# Patient Record
Sex: FEMALE | Race: WHITE | Employment: FULL TIME | URBAN - METROPOLITAN AREA
[De-identification: names, ages, dates, MRNs, and addresses within clinical notes are randomized per-mention and may not be internally consistent; named-entity substitution may affect disease eponyms.]

---

## 2017-04-06 ENCOUNTER — TRANSFERRED RECORDS (OUTPATIENT)
Dept: HEALTH INFORMATION MANAGEMENT | Facility: CLINIC | Age: 37
End: 2017-04-06

## 2017-07-24 LAB
ALT SERPL-CCNC: 18 U/L (ref 0–55)
AST SERPL-CCNC: 16 U/L (ref 5–34)
CREAT SERPL-MCNC: 0.6 MG/DL (ref 0.55–1.02)
GFR SERPL CREATININE-BSD FRML MDRD: 111.9 ML/MIN/1.73M2
GLUCOSE SERPL-MCNC: 93 MG/DL (ref 74–106)
POTASSIUM SERPL-SCNC: 4 MMOL/L (ref 3.5–5.1)
TSH SERPL-ACNC: 2.06 MIU/ML (ref 0.35–4.94)

## 2017-10-30 ENCOUNTER — TRANSFERRED RECORDS (OUTPATIENT)
Dept: HEALTH INFORMATION MANAGEMENT | Facility: CLINIC | Age: 37
End: 2017-10-30

## 2017-11-16 ENCOUNTER — TRANSFERRED RECORDS (OUTPATIENT)
Dept: HEALTH INFORMATION MANAGEMENT | Facility: CLINIC | Age: 37
End: 2017-11-16

## 2017-11-22 ENCOUNTER — PRE VISIT (OUTPATIENT)
Dept: NEUROLOGY | Facility: CLINIC | Age: 37
End: 2017-11-22

## 2017-11-22 NOTE — TELEPHONE ENCOUNTER
PREVISIT INFORMATION                                                    Katharina Marcial scheduled for future visit at [facility] specialty clinics.    Patient is scheduled to see Dr. Waters on 11/29  Reason for visit: pain in neck/eyes, bells palsy post hysterectomy  Referring provider PCP   Has patient seen previous specialist? Yes.  [facility] 2016  Medical Records:  Requested pt fax them from [facility] in [place], will be getting MRI and recent CTs either pushed or mailed on CD    REVIEW                                                      New patient packet mailed to patient: Yes  Medication reconciliation complete: Yes      Current Outpatient Prescriptions   Medication Sig Dispense Refill     Multiple Vitamins-Minerals (MULTIVITAMIN ADULT PO)        VITAMIN D, CHOLECALCIFEROL, PO Take by mouth daily       Ascorbic Acid (VITAMIN C PO)        Omega-3 Fatty Acids (OMEGA 3 PO)          Allergies: Review of patient's allergies indicates no known allergies.        PLAN/FOLLOW-UP NEEDED                                                      Previsit review complete.  Patient will see provider at future scheduled appointment.     Patient Reminders Given:  Please, make sure you bring an updated list of your medications.   If you are having a procedure, please, present 15 minutes early.  If you need to cancel or reschedule,please call [phone].    Debby Echevarria

## 2017-11-26 ENCOUNTER — TRANSFERRED RECORDS (OUTPATIENT)
Dept: HEALTH INFORMATION MANAGEMENT | Facility: CLINIC | Age: 37
End: 2017-11-26

## 2017-11-29 ENCOUNTER — OFFICE VISIT (OUTPATIENT)
Dept: NEUROLOGY | Facility: CLINIC | Age: 37
End: 2017-11-29
Payer: COMMERCIAL

## 2017-11-29 VITALS
DIASTOLIC BLOOD PRESSURE: 71 MMHG | HEART RATE: 88 BPM | SYSTOLIC BLOOD PRESSURE: 105 MMHG | HEIGHT: 65 IN | BODY MASS INDEX: 25.56 KG/M2 | WEIGHT: 153.4 LBS | OXYGEN SATURATION: 100 %

## 2017-11-29 DIAGNOSIS — R20.0 NUMBNESS OF RIGHT HAND: ICD-10-CM

## 2017-11-29 DIAGNOSIS — M54.2 CERVICALGIA: Primary | ICD-10-CM

## 2017-11-29 DIAGNOSIS — R42 DIZZINESS: ICD-10-CM

## 2017-11-29 DIAGNOSIS — H57.04 MYDRIASIS: ICD-10-CM

## 2017-11-29 DIAGNOSIS — R20.0 RIGHT FACIAL NUMBNESS: ICD-10-CM

## 2017-11-29 PROCEDURE — 99203 OFFICE O/P NEW LOW 30 MIN: CPT | Performed by: PSYCHIATRY & NEUROLOGY

## 2017-11-29 RX ORDER — GABAPENTIN 100 MG/1
CAPSULE ORAL
Qty: 90 CAPSULE | Refills: 1 | Status: SHIPPED | OUTPATIENT
Start: 2017-11-29 | End: 2019-12-11

## 2017-11-29 RX ORDER — SCOLOPAMINE TRANSDERMAL SYSTEM 1 MG/1
1 PATCH, EXTENDED RELEASE TRANSDERMAL
COMMUNITY
End: 2019-12-11

## 2017-11-29 RX ORDER — DIAZEPAM 10 MG
TABLET ORAL
Qty: 1 TABLET | Refills: 0 | Status: SHIPPED | OUTPATIENT
Start: 2017-11-29

## 2017-11-29 ASSESSMENT — PAIN SCALES - GENERAL: PAINLEVEL: SEVERE PAIN (6)
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
## 2017-11-29 NOTE — NURSING NOTE
"Katharina Marcial's goals for this visit include: return  She requests these members of her care team be copied on today's visit information:     PCP: Hailey Luna    Referring Provider:  Hailey Luna NP  [facility]  [address] [place] [address]    Chief Complaint   Patient presents with     Consult       Initial /71 (BP Location: Left arm, Patient Position: Standing, Cuff Size: Adult Regular)  Pulse 88  Ht 1.645 m (5' 4.75\")  Wt 69.6 kg (153 lb 6.4 oz)  SpO2 100%  BMI 25.72 kg/m2 Estimated body mass index is 25.72 kg/(m^2) as calculated from the following:    Height as of this encounter: 1.645 m (5' 4.75\").    Weight as of this encounter: 69.6 kg (153 lb 6.4 oz).  Medication Reconciliation: complete    Do you need any medication refills at today's visit? n  "

## 2017-11-29 NOTE — MR AVS SNAPSHOT
After Visit Summary   11/29/2017    Katharina Marcial    MRN: [id number]           Patient Information     Date Of Birth          1980        Visit Information        Provider Department      11/29/2017 1:30 PM Jose Guadalupe Waters MD [facility]        Today's Diagnoses     Cervicalgia    -  1    Numbness of right hand        Right facial numbness        Mydriasis        Dizziness           Follow-ups after your visit        Follow-up notes from your care team     Call patient with results Return if symptoms worsen or fail to improve.      Future tests that were ordered for you today     Open Future Orders        Priority Expected Expires Ordered    MR Cervical Spine w/o & w Contrast Routine  11/29/2018 11/29/2017    MRA Carotid & MR Angiogram Routine  11/29/2018 11/29/2017            Who to contact     If you have questions or need follow up information about today's clinic visit or your schedule please contact [facility] directly at [phone].  Normal or non-critical lab and imaging results will be communicated to you by MyChart, letter or phone within 4 business days after the clinic has received the results. If you do not hear from us within 7 days, please contact the clinic through [employer] or phone. If you have a critical or abnormal lab result, we will notify you by phone as soon as possible.  Submit refill requests through [employer] or call your pharmacy and they will forward the refill request to us. Please allow 3 business days for your refill to be completed.          Additional Information About Your Visit        [employer] Information     [employer] is an electronic gateway that provides easy, online access to your medical records. With [employer], you can request a clinic appointment, read your test results, renew a prescription or communicate with your care team.     To sign up for [employer] visit the website at [email or web address]   You will be asked  "to enter the access code listed below, as well as some personal information. Please follow the directions to create your username and password.     Your access code is: [email or web address]  Expires: 2018  2:24 PM     Your access code will  in 90 days. If you need help or a new code, please contact your [facility] or call [phone] for assistance.        Care EveryWhere ID     This is your Care EveryWhere ID. This could be used by other organizations to access your [place] medical records  [id number]        Your Vitals Were     Pulse Height Pulse Oximetry BMI (Body Mass Index)          88 1.645 m (5' 4.75\") 100% 25.72 kg/m2         Blood Pressure from Last 3 Encounters:   17 105/71    Weight from Last 3 Encounters:   17 69.6 kg (153 lb 6.4 oz)                 Today's Medication Changes          These changes are accurate as of: 17  2:24 PM.  If you have any questions, ask your nurse or doctor.               Start taking these medicines.        Dose/Directions    diazepam 10 MG tablet   Commonly known as:  VALIUM   Used for:  Dizziness, Mydriasis, Right facial numbness, Numbness of right hand, Cervicalgia   Started by:  Jose Guadalupe Waters MD        Take 30 minutes before MRI scans   Quantity:  1 tablet   Refills:  0       gabapentin 100 MG capsule   Commonly known as:  NEURONTIN   Used for:  Cervicalgia, Numbness of right hand, Right facial numbness, Dizziness   Started by:  Jose Guadalupe Waters MD        One at night. May increase to 2 or 3 at night if needed   Quantity:  90 capsule   Refills:  1            Where to get your medicines      These medications were sent to [facility] - [place], [place] - [address]  [address] [address]     Phone:  [phone]     gabapentin 100 MG capsule         Some of these will need a paper prescription and others can be bought over the counter.  Ask your nurse if you have questions.     Bring a paper " prescription for each of these medications     diazepam 10 MG tablet                Primary Care Provider Office Phone # Fax #    Hailey Luna, [place] [phone]       [facility]  [address]  [facility] [address]        Equal Access to Services     MAUREEN TABOR : Hadii aad ku hadasho Soomaali, waaxda luqadaha, qaybta kaalmada adeegyada, waxay idiin hayaan adeeg khjojosh lajuvencio . So [facility] [phone].    ATENCIÓN: Si habla español, tiene a perez disposición servicios gratuitos de asistencia lingüística. Llame al [phone].    We comply with applicable federal civil rights laws and [place] laws. We do not discriminate on the basis of race, color, national origin, age, disability, sex, sexual orientation, or gender identity.            Thank you!     Thank you for choosing [facility]  for your care. Our goal is always to provide you with excellent care. Hearing back from our patients is one way we can continue to improve our services. Please take a few minutes to complete the written survey that you may receive in the mail after your visit with us. Thank you!             Your Updated Medication List - Protect others around you: Learn how to safely use, store and throw away your medicines at www.disposemymeds.org.          This list is accurate as of: 11/29/17  2:24 PM.  Always use your most recent med list.                   Brand Name Dispense Instructions for use Diagnosis    diazepam 10 MG tablet    VALIUM    1 tablet    Take 30 minutes before MRI scans    Dizziness, Mydriasis, Right facial numbness, Numbness of right hand, Cervicalgia       gabapentin 100 MG capsule    NEURONTIN    90 capsule    One at night. May increase to 2 or 3 at night if needed    Cervicalgia, Numbness of right hand, Right facial numbness, Dizziness       MULTIVITAMIN ADULT PO      Take 1 tablet by mouth daily        OMEGA 3 PO      Take by mouth daily        scopolamine 72 hr patch    TRANSDERM      Place 1 patch onto the skin every 72 hours        VITAMIN C PO      Take 500 mg by mouth daily        VITAMIN D (CHOLECALCIFEROL) PO      Take 5,000 Units by mouth daily

## 2017-11-30 ENCOUNTER — TELEPHONE (OUTPATIENT)
Dept: NEUROLOGY | Facility: CLINIC | Age: 37
End: 2017-11-30

## 2017-11-30 NOTE — TELEPHONE ENCOUNTER
Called pt back, stated she stopped the scopolamine patch and her left eye dilated and her right eye was normal. She went back into her PCP's clinic and they recommended she check with our clinic about a head MRI. Dr. Waters called pt to discuss. Debby Echevarria RN

## 2017-11-30 NOTE — TELEPHONE ENCOUNTER
[facility] Call Center    Phone Message    Name of Caller: Katharina    Phone Number: Home number on file [phone] (home)    Best time to return call: any    May a detailed message be left on voicemail: yes    Relation to patient: Self    Reason for Call: Other: Hyser patient, seen 11.29.17.  Said she has some information for care team to update them on upcoming procedure, saw general physician today.  regarding imaging of eye and dilation.       Action Taken: Message routed to:  Adult Clinics: Neurology p [id number]
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
## 2017-12-08 ENCOUNTER — TRANSFERRED RECORDS (OUTPATIENT)
Dept: HEALTH INFORMATION MANAGEMENT | Facility: CLINIC | Age: 37
End: 2017-12-08

## 2019-10-21 ENCOUNTER — TRANSFERRED RECORDS (OUTPATIENT)
Dept: HEALTH INFORMATION MANAGEMENT | Facility: CLINIC | Age: 39
End: 2019-10-21

## 2019-12-05 ENCOUNTER — TRANSCRIBE ORDERS (OUTPATIENT)
Dept: OTHER | Age: 39
End: 2019-12-05

## 2019-12-05 DIAGNOSIS — M54.81 OCCIPITAL NEURALGIA OF LEFT SIDE: Primary | ICD-10-CM

## 2019-12-09 NOTE — TELEPHONE ENCOUNTER
Action 12/9/19   Action Taken Records received from [facility] via fax. Sent to urgent scanning     Phone Call:  12/9/19   Contact Name [facility]   Outcome LVM to push MRI Brain 5/15/19

## 2019-12-09 NOTE — TELEPHONE ENCOUNTER
Action 12/9/19   Action Taken [facility] requesting to fax in a request.    Imaging request faxed to [facility]

## 2019-12-09 NOTE — TELEPHONE ENCOUNTER
"  RECORDS RECEIVED FROM: External - [facility]. [facility]    DATE RECEIVED: 12/11/19   NOTES (FOR ALL VISITS) STATUS DETAILS   OFFICE NOTE from referring provider Received 10/21/19  9/23/19   OFFICE NOTE from other specialist Internal [facility]:  8/19/19  4/10/19    Dr Waters @ [facility]:  11/29/17   DISCHARGE SUMMARY from hospital N/A    DISCHARGE REPORT from the ER Care [facility]where [facility]:  11/26/17   OPERATIVE REPORT N/A    MEDICATION LIST Internal    IMAGING  (FOR ALL VISITS)     EMG Received [facility]:  9/14/16   EEG N/A    ECT N/A    MRI (HEAD, NECK, SPINE) In process [facility]:  MRI Brain 5/15/19    [facility]:  MRI Head 12/20/18    [facility]:  MRI Cervical Spine 12/8/17  MRI Brain 1/6/16    [facility]:  MRI Thoracic Spine 11/30/16  MRI Cervical Spine 9/19/16   LUMBAR PUNCTURE N/A    [facility] Scan N/A    CT (HEAD, NECK, SPINE) Received [facility]:  CT Brain 11/26/17      Phone Call:  12/9/19   Contact Name [facility]   Outcome Spoke with  - records will be faxed and set to \"ATTN LEA\"         "

## 2019-12-11 ENCOUNTER — OFFICE VISIT (OUTPATIENT)
Dept: NEUROLOGY | Facility: CLINIC | Age: 39
End: 2019-12-11
Payer: COMMERCIAL

## 2019-12-11 ENCOUNTER — HOSPITAL ENCOUNTER (OUTPATIENT)
Facility: CLINIC | Age: 39
Setting detail: OBSERVATION
Discharge: HOME | End: 2019-12-12
Attending: EMERGENCY MEDICINE | Admitting: EMERGENCY MEDICINE
Payer: COMMERCIAL

## 2019-12-11 ENCOUNTER — PRE VISIT (OUTPATIENT)
Dept: NEUROLOGY | Facility: CLINIC | Age: 39
End: 2019-12-11

## 2019-12-11 ENCOUNTER — APPOINTMENT (OUTPATIENT)
Dept: GENERAL RADIOLOGY | Facility: CLINIC | Age: 39
End: 2019-12-11
Attending: EMERGENCY MEDICINE
Payer: COMMERCIAL

## 2019-12-11 VITALS
HEART RATE: 107 BPM | OXYGEN SATURATION: 98 % | BODY MASS INDEX: 26.48 KG/M2 | DIASTOLIC BLOOD PRESSURE: 75 MMHG | WEIGHT: 157.9 LBS | SYSTOLIC BLOOD PRESSURE: 120 MMHG

## 2019-12-11 DIAGNOSIS — G43.719 INTRACTABLE CHRONIC MIGRAINE WITHOUT AURA AND WITHOUT STATUS MIGRAINOSUS: ICD-10-CM

## 2019-12-11 DIAGNOSIS — R10.9 ABDOMINAL PAIN, UNSPECIFIED ABDOMINAL LOCATION: Primary | ICD-10-CM

## 2019-12-11 DIAGNOSIS — R11.0 NAUSEA: ICD-10-CM

## 2019-12-11 DIAGNOSIS — R51.9 LEFT-SIDED HEADACHE: ICD-10-CM

## 2019-12-11 DIAGNOSIS — R51.9 INTRACTABLE HEADACHE, UNSPECIFIED CHRONICITY PATTERN, UNSPECIFIED HEADACHE TYPE: ICD-10-CM

## 2019-12-11 DIAGNOSIS — E86.0 DEHYDRATION: ICD-10-CM

## 2019-12-11 DIAGNOSIS — B02.30 HERPES ZOSTER OPHTHALMICUS: ICD-10-CM

## 2019-12-11 DIAGNOSIS — M54.81 OCCIPITAL NEURALGIA OF LEFT SIDE: ICD-10-CM

## 2019-12-11 LAB
ALBUMIN SERPL-MCNC: 4.3 G/DL (ref 3.4–5)
ALBUMIN UR-MCNC: 10 MG/DL
ALP SERPL-CCNC: 67 U/L (ref 40–150)
ALT SERPL W P-5'-P-CCNC: 25 U/L (ref 0–50)
ANION GAP SERPL CALCULATED.3IONS-SCNC: 6 MMOL/L (ref 3–14)
APPEARANCE UR: CLEAR
AST SERPL W P-5'-P-CCNC: 24 U/L (ref 0–45)
BACTERIA #/AREA URNS HPF: ABNORMAL /HPF
BASOPHILS # BLD AUTO: 0 10E9/L (ref 0–0.2)
BASOPHILS NFR BLD AUTO: 0 %
BILIRUB SERPL-MCNC: 0.6 MG/DL (ref 0.2–1.3)
BILIRUB UR QL STRIP: NEGATIVE
BUN SERPL-MCNC: 10 MG/DL (ref 7–30)
CALCIUM SERPL-MCNC: 9.2 MG/DL (ref 8.5–10.1)
CHLORIDE SERPL-SCNC: 107 MMOL/L (ref 94–109)
CO2 SERPL-SCNC: 26 MMOL/L (ref 20–32)
COLOR UR AUTO: YELLOW
CREAT SERPL-MCNC: 0.58 MG/DL (ref 0.52–1.04)
DIFFERENTIAL METHOD BLD: NORMAL
EOSINOPHIL # BLD AUTO: 0 10E9/L (ref 0–0.7)
EOSINOPHIL NFR BLD AUTO: 0 %
ERYTHROCYTE [DISTWIDTH] IN BLOOD BY AUTOMATED COUNT: 12.8 % (ref 10–15)
GFR SERPL CREATININE-BSD FRML MDRD: >90 ML/MIN/{1.73_M2}
GLUCOSE SERPL-MCNC: 87 MG/DL (ref 70–99)
GLUCOSE UR STRIP-MCNC: NEGATIVE MG/DL
HCG UR QL: NEGATIVE
HCT VFR BLD AUTO: 44.4 % (ref 35–47)
HGB BLD-MCNC: 14.5 G/DL (ref 11.7–15.7)
HGB UR QL STRIP: NEGATIVE
KETONES UR STRIP-MCNC: 80 MG/DL
LEUKOCYTE ESTERASE UR QL STRIP: NEGATIVE
LIPASE SERPL-CCNC: 76 U/L (ref 73–393)
LYMPHOCYTES # BLD AUTO: 1.3 10E9/L (ref 0.8–5.3)
LYMPHOCYTES NFR BLD AUTO: 17.7 %
MAGNESIUM SERPL-MCNC: 2.2 MG/DL (ref 1.6–2.3)
MCH RBC QN AUTO: 31.9 PG (ref 26.5–33)
MCHC RBC AUTO-ENTMCNC: 32.7 G/DL (ref 31.5–36.5)
MCV RBC AUTO: 98 FL (ref 78–100)
MONOCYTES # BLD AUTO: 0.2 10E9/L (ref 0–1.3)
MONOCYTES NFR BLD AUTO: 2.7 %
MUCOUS THREADS #/AREA URNS LPF: PRESENT /LPF
NEUTROPHILS # BLD AUTO: 5.8 10E9/L (ref 1.6–8.3)
NEUTROPHILS NFR BLD AUTO: 79.6 %
NITRATE UR QL: NEGATIVE
PH UR STRIP: 6 PH (ref 5–7)
PLATELET # BLD AUTO: 223 10E9/L (ref 150–450)
PLATELET # BLD EST: NORMAL 10*3/UL
POTASSIUM SERPL-SCNC: 4.4 MMOL/L (ref 3.4–5.3)
PROT SERPL-MCNC: 8.1 G/DL (ref 6.8–8.8)
RBC # BLD AUTO: 4.55 10E12/L (ref 3.8–5.2)
RBC #/AREA URNS AUTO: 1 /HPF (ref 0–2)
RBC MORPH BLD: NORMAL
SODIUM SERPL-SCNC: 140 MMOL/L (ref 133–144)
SOURCE: ABNORMAL
SP GR UR STRIP: 1.02 (ref 1–1.03)
SQUAMOUS #/AREA URNS AUTO: 1 /HPF (ref 0–1)
UROBILINOGEN UR STRIP-MCNC: NORMAL MG/DL (ref 0–2)
WBC # BLD AUTO: 7.3 10E9/L (ref 4–11)
WBC #/AREA URNS AUTO: 2 /HPF (ref 0–5)

## 2019-12-11 PROCEDURE — 93005 ELECTROCARDIOGRAM TRACING: CPT | Performed by: EMERGENCY MEDICINE

## 2019-12-11 PROCEDURE — 83735 ASSAY OF MAGNESIUM: CPT | Performed by: EMERGENCY MEDICINE

## 2019-12-11 PROCEDURE — 25800030 ZZH RX IP 258 OP 636: Performed by: EMERGENCY MEDICINE

## 2019-12-11 PROCEDURE — 96361 HYDRATE IV INFUSION ADD-ON: CPT | Performed by: EMERGENCY MEDICINE

## 2019-12-11 PROCEDURE — 84443 ASSAY THYROID STIM HORMONE: CPT | Performed by: EMERGENCY MEDICINE

## 2019-12-11 PROCEDURE — 96376 TX/PRO/DX INJ SAME DRUG ADON: CPT

## 2019-12-11 PROCEDURE — 81025 URINE PREGNANCY TEST: CPT | Performed by: EMERGENCY MEDICINE

## 2019-12-11 PROCEDURE — 96375 TX/PRO/DX INJ NEW DRUG ADDON: CPT | Performed by: EMERGENCY MEDICINE

## 2019-12-11 PROCEDURE — 81001 URINALYSIS AUTO W/SCOPE: CPT | Performed by: EMERGENCY MEDICINE

## 2019-12-11 PROCEDURE — 25000128 H RX IP 250 OP 636: Performed by: EMERGENCY MEDICINE

## 2019-12-11 PROCEDURE — 93010 ELECTROCARDIOGRAM REPORT: CPT | Mod: Z6 | Performed by: EMERGENCY MEDICINE

## 2019-12-11 PROCEDURE — 25000128 H RX IP 250 OP 636: Performed by: NURSE PRACTITIONER

## 2019-12-11 PROCEDURE — G0378 HOSPITAL OBSERVATION PER HR: HCPCS

## 2019-12-11 PROCEDURE — 25000125 ZZHC RX 250: Performed by: EMERGENCY MEDICINE

## 2019-12-11 PROCEDURE — 99285 EMERGENCY DEPT VISIT HI MDM: CPT | Mod: 25 | Performed by: EMERGENCY MEDICINE

## 2019-12-11 PROCEDURE — 25000132 ZZH RX MED GY IP 250 OP 250 PS 637: Performed by: NURSE PRACTITIONER

## 2019-12-11 PROCEDURE — 80053 COMPREHEN METABOLIC PANEL: CPT | Performed by: EMERGENCY MEDICINE

## 2019-12-11 PROCEDURE — 96366 THER/PROPH/DIAG IV INF ADDON: CPT

## 2019-12-11 PROCEDURE — 85025 COMPLETE CBC W/AUTO DIFF WBC: CPT | Performed by: EMERGENCY MEDICINE

## 2019-12-11 PROCEDURE — 74019 RADEX ABDOMEN 2 VIEWS: CPT

## 2019-12-11 PROCEDURE — 83690 ASSAY OF LIPASE: CPT | Performed by: EMERGENCY MEDICINE

## 2019-12-11 PROCEDURE — 99220 ZZC INITIAL OBSERVATION CARE,LEVL III: CPT | Mod: Z6 | Performed by: EMERGENCY MEDICINE

## 2019-12-11 PROCEDURE — 96365 THER/PROPH/DIAG IV INF INIT: CPT | Performed by: EMERGENCY MEDICINE

## 2019-12-11 PROCEDURE — 83516 IMMUNOASSAY NONANTIBODY: CPT | Performed by: EMERGENCY MEDICINE

## 2019-12-11 PROCEDURE — 25000132 ZZH RX MED GY IP 250 OP 250 PS 637: Performed by: EMERGENCY MEDICINE

## 2019-12-11 PROCEDURE — 96375 TX/PRO/DX INJ NEW DRUG ADDON: CPT

## 2019-12-11 RX ORDER — NALOXONE HYDROCHLORIDE 0.4 MG/ML
.1-.4 INJECTION, SOLUTION INTRAMUSCULAR; INTRAVENOUS; SUBCUTANEOUS
Status: DISCONTINUED | OUTPATIENT
Start: 2019-12-11 | End: 2019-12-12 | Stop reason: HOSPADM

## 2019-12-11 RX ORDER — ONDANSETRON 4 MG/1
4 TABLET, ORALLY DISINTEGRATING ORAL EVERY 6 HOURS PRN
Status: DISCONTINUED | OUTPATIENT
Start: 2019-12-11 | End: 2019-12-12 | Stop reason: HOSPADM

## 2019-12-11 RX ORDER — METOCLOPRAMIDE HYDROCHLORIDE 5 MG/ML
5 INJECTION INTRAMUSCULAR; INTRAVENOUS ONCE
Status: COMPLETED | OUTPATIENT
Start: 2019-12-11 | End: 2019-12-11

## 2019-12-11 RX ORDER — TEMAZEPAM 15 MG/1
15 CAPSULE ORAL
Status: DISCONTINUED | OUTPATIENT
Start: 2019-12-11 | End: 2019-12-12 | Stop reason: HOSPADM

## 2019-12-11 RX ORDER — ACETAMINOPHEN 325 MG/1
650 TABLET ORAL EVERY 4 HOURS PRN
Status: DISCONTINUED | OUTPATIENT
Start: 2019-12-11 | End: 2019-12-11

## 2019-12-11 RX ORDER — PROCHLORPERAZINE 25 MG
25 SUPPOSITORY, RECTAL RECTAL EVERY 12 HOURS PRN
Status: DISCONTINUED | OUTPATIENT
Start: 2019-12-11 | End: 2019-12-12 | Stop reason: HOSPADM

## 2019-12-11 RX ORDER — KETOROLAC TROMETHAMINE 15 MG/ML
15 INJECTION, SOLUTION INTRAMUSCULAR; INTRAVENOUS ONCE
Status: COMPLETED | OUTPATIENT
Start: 2019-12-11 | End: 2019-12-11

## 2019-12-11 RX ORDER — DIPHENHYDRAMINE HYDROCHLORIDE 50 MG/ML
25 INJECTION INTRAMUSCULAR; INTRAVENOUS ONCE
Status: COMPLETED | OUTPATIENT
Start: 2019-12-11 | End: 2019-12-11

## 2019-12-11 RX ORDER — ONDANSETRON 2 MG/ML
4 INJECTION INTRAMUSCULAR; INTRAVENOUS EVERY 6 HOURS PRN
Status: DISCONTINUED | OUTPATIENT
Start: 2019-12-11 | End: 2019-12-12 | Stop reason: HOSPADM

## 2019-12-11 RX ORDER — KETOROLAC TROMETHAMINE 30 MG/ML
15 INJECTION, SOLUTION INTRAMUSCULAR; INTRAVENOUS EVERY 6 HOURS PRN
Status: DISCONTINUED | OUTPATIENT
Start: 2019-12-11 | End: 2019-12-12 | Stop reason: HOSPADM

## 2019-12-11 RX ORDER — TRAZODONE HYDROCHLORIDE 50 MG/1
1 TABLET, FILM COATED ORAL
Refills: 0 | COMMUNITY
Start: 2019-12-05 | End: 2019-12-11

## 2019-12-11 RX ORDER — DEXAMETHASONE SODIUM PHOSPHATE 4 MG/ML
4 INJECTION, SOLUTION INTRA-ARTICULAR; INTRALESIONAL; INTRAMUSCULAR; INTRAVENOUS; SOFT TISSUE ONCE
Status: COMPLETED | OUTPATIENT
Start: 2019-12-11 | End: 2019-12-11

## 2019-12-11 RX ORDER — ACETAMINOPHEN 325 MG/1
650 TABLET ORAL EVERY 4 HOURS PRN
Status: DISCONTINUED | OUTPATIENT
Start: 2019-12-11 | End: 2019-12-12 | Stop reason: HOSPADM

## 2019-12-11 RX ORDER — ACETAMINOPHEN 650 MG/1
650 SUPPOSITORY RECTAL EVERY 4 HOURS PRN
Status: DISCONTINUED | OUTPATIENT
Start: 2019-12-11 | End: 2019-12-12 | Stop reason: HOSPADM

## 2019-12-11 RX ORDER — PROCHLORPERAZINE MALEATE 10 MG
10 TABLET ORAL EVERY 6 HOURS PRN
Status: DISCONTINUED | OUTPATIENT
Start: 2019-12-11 | End: 2019-12-12 | Stop reason: HOSPADM

## 2019-12-11 RX ORDER — METOCLOPRAMIDE HYDROCHLORIDE 5 MG/ML
5 INJECTION INTRAMUSCULAR; INTRAVENOUS EVERY 4 HOURS PRN
Status: DISCONTINUED | OUTPATIENT
Start: 2019-12-11 | End: 2019-12-12 | Stop reason: HOSPADM

## 2019-12-11 RX ORDER — MAGNESIUM SULFATE HEPTAHYDRATE 40 MG/ML
4 INJECTION, SOLUTION INTRAVENOUS ONCE
Status: COMPLETED | OUTPATIENT
Start: 2019-12-11 | End: 2019-12-12

## 2019-12-11 RX ORDER — SODIUM CHLORIDE 9 MG/ML
1000 INJECTION, SOLUTION INTRAVENOUS CONTINUOUS
Status: DISCONTINUED | OUTPATIENT
Start: 2019-12-11 | End: 2019-12-12 | Stop reason: HOSPADM

## 2019-12-11 RX ORDER — ACETAMINOPHEN 500 MG
1000 TABLET ORAL ONCE
Status: COMPLETED | OUTPATIENT
Start: 2019-12-11 | End: 2019-12-11

## 2019-12-11 RX ADMIN — MAGNESIUM SULFATE HEPTAHYDRATE 4 G: 40 INJECTION, SOLUTION INTRAVENOUS at 15:33

## 2019-12-11 RX ADMIN — SODIUM CHLORIDE 1000 ML: 9 INJECTION, SOLUTION INTRAVENOUS at 13:57

## 2019-12-11 RX ADMIN — TEMAZEPAM 15 MG: 15 CAPSULE ORAL at 22:10

## 2019-12-11 RX ADMIN — SODIUM CHLORIDE 1000 ML: 9 INJECTION, SOLUTION INTRAVENOUS at 16:32

## 2019-12-11 RX ADMIN — METOCLOPRAMIDE 5 MG: 5 INJECTION, SOLUTION INTRAMUSCULAR; INTRAVENOUS at 14:14

## 2019-12-11 RX ADMIN — DEXAMETHASONE SODIUM PHOSPHATE 4 MG: 4 INJECTION, SOLUTION INTRA-ARTICULAR; INTRALESIONAL; INTRAMUSCULAR; INTRAVENOUS; SOFT TISSUE at 18:54

## 2019-12-11 RX ADMIN — LIDOCAINE HYDROCHLORIDE 30 ML: 20 SOLUTION ORAL; TOPICAL at 15:09

## 2019-12-11 RX ADMIN — KETOROLAC TROMETHAMINE 15 MG: 15 INJECTION, SOLUTION INTRAMUSCULAR; INTRAVENOUS at 14:08

## 2019-12-11 RX ADMIN — ACETAMINOPHEN 1000 MG: 500 TABLET, FILM COATED ORAL at 15:08

## 2019-12-11 RX ADMIN — DIPHENHYDRAMINE HYDROCHLORIDE 25 MG: 50 INJECTION, SOLUTION INTRAMUSCULAR; INTRAVENOUS at 14:07

## 2019-12-11 RX ADMIN — KETOROLAC TROMETHAMINE 15 MG: 30 INJECTION, SOLUTION INTRAMUSCULAR; INTRAVENOUS at 22:18

## 2019-12-11 ASSESSMENT — ENCOUNTER SYMPTOMS
EYE REDNESS: 0
SLEEP DISTURBANCES DUE TO BREATHING: 0
ABDOMINAL PAIN: 1
COLOR CHANGE: 0
FEVER: 0
SHORTNESS OF BREATH: 0
JOINT SWELLING: 0
HYPERTENSION: 0
DISTURBANCES IN COORDINATION: 0
PANIC: 1
CHILLS: 1
MEMORY LOSS: 1
ALTERED TEMPERATURE REGULATION: 1
VOMITING: 1
BREAST PAIN: 1
BACK PAIN: 1
DECREASED CONCENTRATION: 1
DIFFICULTY URINATING: 0
WEIGHT GAIN: 0
LOSS OF CONSCIOUSNESS: 0
NECK PAIN: 1
HEADACHES: 1
BLOATING: 1
ORTHOPNEA: 0
NIGHT SWEATS: 0
HEARTBURN: 0
EYE PAIN: 1
INCREASED ENERGY: 1
SEIZURES: 0
BRUISES/BLEEDS EASILY: 1
SWOLLEN GLANDS: 0
DIZZINESS: 0
EYE WATERING: 0
ARTHRALGIAS: 0
ARTHRALGIAS: 1
MYALGIAS: 1
HEADACHES: 1
LEG PAIN: 1
DIARRHEA: 1
POLYPHAGIA: 0
FEVER: 0
CONFUSION: 0
VOMITING: 1
DOUBLE VISION: 1
NECK STIFFNESS: 0
POLYDIPSIA: 0
NERVOUS/ANXIOUS: 1
NAUSEA: 1
CONSTIPATION: 0
FATIGUE: 1
DEPRESSION: 1
INSOMNIA: 1
WEIGHT LOSS: 1
SYNCOPE: 0
BREAST MASS: 0
ABDOMINAL PAIN: 1
DECREASED APPETITE: 1
NAUSEA: 1
PALPITATIONS: 1

## 2019-12-11 ASSESSMENT — HEADACHE IMPACT TEST (HIT 6)
WHEN YOU HAVE A HEADACHE HOW OFTEN DO YOU WISH YOU COULD LIE DOWN: VERY OFTEN
HIT6 TOTAL SCORE: 74
HOW OFTEN HAVE YOU FELT FED UP OR IRRITATED BECAUSE OF YOUR HEADACHES: ALWAYS
WHEN YOU HAVE HEADACHES HOW OFTEN IS THE PAIN SEVERE: ALWAYS
HOW OFTEN DO HEADACHES LIMIT YOUR DAILY ACTIVITIES: VERY OFTEN
HOW OFTEN HAVE YOU FELT TOO TIRED TO WORK BECAUSE OF YOUR HEADACHES: ALWAYS
HOW OFTEN DID HEADACHS LIMIT CONCENTRATION ON WORK OR DAILY ACTIVITY: ALWAYS

## 2019-12-11 ASSESSMENT — PAIN SCALES - GENERAL: PAINLEVEL: EXTREME PAIN (8)

## 2019-12-11 ASSESSMENT — PATIENT HEALTH QUESTIONNAIRE - PHQ9
10. IF YOU CHECKED OFF ANY PROBLEMS, HOW DIFFICULT HAVE THESE PROBLEMS MADE IT FOR YOU TO DO YOUR WORK, TAKE CARE OF THINGS AT HOME, OR GET ALONG WITH OTHER PEOPLE: EXTREMELY DIFFICULT
SUM OF ALL RESPONSES TO PHQ QUESTIONS 1-9: 20
SUM OF ALL RESPONSES TO PHQ QUESTIONS 1-9: 20

## 2019-12-11 NOTE — ED PROVIDER NOTES
History     Chief Complaint   Patient presents with     Abdominal Pain     HPI  Katharina Marcial is a 39 year old female with a history of ileus/mild enteritis, IBS, migraines, and herpes zoster (in January 2018 treated with antiviral medication) who presents to the Emergency Department today for evaluation of abdominal pain.  Per chart review, the patient had been seen [facility] in [place] from 12/2-12/3 for complaints of the same symptoms.  During this visit the patient had a CT performed of the abdomen and pelvis which showed fluid-filled loops of small and large bowel consistent with ileus or mild enteritis without any other abdominal findings.  During this visit, the patient was also noted to be on narcotics for a previous right foot injury.  At discharge the patient's symptoms were thought to be a combination of constipation from narcotic use as well as irritable bowel syndrome.    Today, patient reports that she has continued to have significant abdominal pain and headache.  The patient has been having issues with headaches since the beginning of this year.  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  Patient was at neurology clinic today for further evaluation of this.  Patient had pulse noncontrasted and contrasted MRIs of the brain performed (impressions below HPI).  She also reported having abdominal pain and diarrhea there and was referred to the ED for further evaluation.  Here, she reports this abdominal pain to be in her left upper quadrant radiating through her left side into her left flank pain.  As noted above, the patient was seen for this in [place] ED; however, the patient and her mother are not satisfied with her visit and presents to our ED for further evaluation as her symptoms have not improved.  The patient reports that she is currently only eating soft foods such as baby food.  She reports that when she tries to eat solid food she has  vomiting.  Likewise, she has not been able to take medications as she will subsequently vomit.  The patient is not currently taking any pain medication for her headache or abdominal pain.  The patient otherwise reports having chills at night.  She denies fever or blood in stool.  The patient otherwise reports a partial hysterectomy in 2017 as well as hernia repair in 2013.    MRI brain with contrast  IMPRESSION:  1. No evidence for an acute intracranial abnormality.  No acute intracranial  hemorrhage, acute or subacute infarct, or intracranial mass.  2. 2 mm of inferior cerebellar tonsillar ectopia, not meeting radiographic  criteria for Chiari 1 malformation.  There is no edema the adjacent cerebellum  or brainstem.  3. Mild right mastoid tip effusion which can be seen with eustachian tube  dysfunction or mild mastoiditis.    MRI brain w/o contrast performed   IMPRESSION:     1.  Normal noncontrast brain MRI.     I have reviewed the Medications, Allergies, Past Medical and Surgical History, and Social History in the Epic system.    Review of Systems   Constitutional: Negative for fever.   HENT: Negative for congestion.    Eyes: Negative for redness.   Respiratory: Negative for shortness of breath.    Cardiovascular: Negative for chest pain.   Gastrointestinal: Positive for abdominal pain, diarrhea, nausea and vomiting.   Genitourinary: Negative for difficulty urinating.   Musculoskeletal: Negative for arthralgias and neck stiffness.   Skin: Negative for color change.   Neurological: Positive for headaches.   Psychiatric/Behavioral: Negative for confusion.     Physical Exam   BP: 114/80  Pulse: 78  Heart Rate: 101  Temp: 99.1  F (37.3  C)  Resp: 16  Weight: 71.2 kg (157 lb)  SpO2: 98 %    Physical Exam  Constitutional:       General: She is in acute distress.      Appearance: She is not ill-appearing, toxic-appearing or diaphoretic.      Comments: Crying during exam   HENT:      Head: Atraumatic.      Mouth/Throat:       Pharynx: No oropharyngeal exudate.   Eyes:      General: No scleral icterus.     Pupils: Pupils are equal, round, and reactive to light.   Neck:      Musculoskeletal: No neck rigidity.   Cardiovascular:      Rate and Rhythm: Normal rate and regular rhythm.      Heart sounds: Normal heart sounds.   Pulmonary:      Effort: No respiratory distress.      Breath sounds: Normal breath sounds.   Abdominal:      General: Bowel sounds are normal. There is no distension.      Palpations: Abdomen is soft.      Tenderness: There is abdominal tenderness. There is left CVA tenderness. There is no right CVA tenderness or guarding.   Musculoskeletal:         General: No tenderness.   Skin:     General: Skin is warm.      Capillary Refill: Capillary refill takes less than 2 seconds.      Findings: No rash.   Neurological:      General: No focal deficit present.      Mental Status: She is alert and oriented to person, place, and time.   Psychiatric:         Mood and Affect: Mood normal.         ED Course   12:55 PM  The patient was seen and examined by Dr. Ac in [facility]       Procedures             EKG Interpretation:      Interpreted by Yosef Ac MD  Time reviewed: 13:22  Symptoms at time of EKG: abdominal pain and headache   Rhythm: normal sinus   Rate: normal  Axis: normal  Ectopy: none  Conduction: normal  ST Segments/ T Waves: No ST-T wave changes  Q Waves: none  Comparison to prior: No old EKG available    Clinical Impression: normal EKG: possible left atrial enlargement    Results for orders placed or performed during the hospital encounter of 12/11/19 (from the past 24 hour(s))   CBC with platelets differential   Result Value Ref Range    WBC 7.3 4.0 - 11.0 10e9/L    RBC Count 4.55 3.8 - 5.2 10e12/L    Hemoglobin 14.5 11.7 - 15.7 g/dL    Hematocrit 44.4 35.0 - 47.0 %    MCV 98 78 - 100 fl    MCH 31.9 26.5 - 33.0 pg    MCHC 32.7 31.5 - 36.5 g/dL    RDW 12.8 10.0 - 15.0 %    Platelet Count 223 150 - 450 10e9/L     Diff Method Manual Differential     % Neutrophils 79.6 %    % Lymphocytes 17.7 %    % Monocytes 2.7 %    % Eosinophils 0.0 %    % Basophils 0.0 %    Absolute Neutrophil 5.8 1.6 - 8.3 10e9/L    Absolute Lymphocytes 1.3 0.8 - 5.3 10e9/L    Absolute Monocytes 0.2 0.0 - 1.3 10e9/L    Absolute Eosinophils 0.0 0.0 - 0.7 10e9/L    Absolute Basophils 0.0 0.0 - 0.2 10e9/L    RBC Morphology Normal     Platelet Estimate Confirming automated cell count    Comprehensive metabolic panel   Result Value Ref Range    Sodium 140 133 - 144 mmol/L    Potassium 4.4 3.4 - 5.3 mmol/L    Chloride 107 94 - 109 mmol/L    Carbon Dioxide 26 20 - 32 mmol/L    Anion Gap 6 3 - 14 mmol/L    Glucose 87 70 - 99 mg/dL    Urea Nitrogen 10 7 - 30 mg/dL    Creatinine 0.58 0.52 - 1.04 mg/dL    GFR Estimate >90 >60 mL/min/[1.73_m2]    GFR Estimate If Black >90 >60 mL/min/[1.73_m2]    Calcium 9.2 8.5 - 10.1 mg/dL    Bilirubin Total 0.6 0.2 - 1.3 mg/dL    Albumin 4.3 3.4 - 5.0 g/dL    Protein Total 8.1 6.8 - 8.8 g/dL    Alkaline Phosphatase 67 40 - 150 U/L    ALT 25 0 - 50 U/L    AST 24 0 - 45 U/L   Lipase   Result Value Ref Range    Lipase 76 73 - 393 U/L   Magnesium   Result Value Ref Range    Magnesium 2.2 1.6 - 2.3 mg/dL   EKG 12-lead, tracing only   Result Value Ref Range    Interpretation ECG Click View Image link to view waveform and result    XR Abdomen 2 Views    Narrative    EXAM: XR ABDOMEN 2 VW  12/11/2019 1:48 PM     HISTORY:  diffuse pain, nausea, hx of recent ileus, r/o sbo       COMPARISON:  None    FINDINGS:   Supine and upright abdominal radiographs were obtained.   Nonobstructive bowel gas pattern. No portal venous gas, pneumatosis or  free air in the abdomen. No suspicious osseous lesions.The lung bases  are clear.      Impression    IMPRESSION: Nonobstructive bowel gas pattern.     I have personally reviewed the examination and initial interpretation  and I agree with the findings.    LANCE RODRIGUEZ, DO   HCG qualitative urine (UPT)    Result Value Ref Range    HCG Qual Urine Negative NEG^Negative          Labs Ordered and Resulted from Time of ED Arrival Up to the Time of Departure from the ED   CBC WITH PLATELETS DIFFERENTIAL   COMPREHENSIVE METABOLIC PANEL   LIPASE   UA MACROSCOPIC WITH REFLEX TO MICRO AND CULTURE   HCG QUALITATIVE URINE   MAGNESIUM   PERIPHERAL IV CATHETER            Assessments & Plan (with Medical Decision Making)   Patient presented for evaluation of headache and abdominal pain.  As above, patient has had these symptoms chronically, however, they have been worsening leading up to her emergency department visit today.  Patient and family are unhappy with the care that they have received previously.  On arrival, she is tachycardic, and significant pain.  On exam, she has tenderness to palpation in the left upper quadrant and left costophrenic angle.  She has nausea without any active vomiting.  She continues to complain of a left frontal temporal and occipital headache that is consistent with her chronic headache from what has been diagnosed as post-herpetic occipital neuralgia.    Given the patient was sent from neurology office, will consult the neuro team for any recommendations for treatment of her chronic headaches.  Regarding her GI complaints, differential is broad.  She is recently diagnosed with ileus and enteritis.  She has had decreased bowel movements and continued nausea, will obtain x-ray to evaluate for small bowel obstruction.  Colitis, pancreatitis, IBS flare, IBD in differential as well. I do not want to repeat CT given that she just had one 1 week previously.  We will treat headache with migraine cocktail.  Can use narcotics sparingly for abdominal pain as needed.  Will reassess after CBC, CMP, lipase and abdominal x-ray.  Anticipate admission to the hospital given decreased p.o. intake and continued pain.    All labs normal.  Abdominal x-ray normal.  Patient received minimal improvement with migraine  cocktail.  Now giving Tylenol and IV magnesium.  Also giving GI cocktail for her chronic abdominal pain which could be 2/2 gastritis, pud, gatroparesis, IBD.  Neurological symptoms are likely consistent with patient's previously diagnosed occipital neuralgia.  Neuro examination is unremarkable, no signs of serious intracranial etiology or infectious etiology. No imaging indicated. Patient will need to be admitted to observation for her intractable headache for IV pain medication and further monitoring.  Additionally, I have consulted neurology to evaluate the patient to see if she would be a candidate for an occipital nerve block or any other adjuvant therapy.  I had a long discussion with the patient and her family about the need for her to continue to follow-up as an outpatient for chronic abdominal pain.  They are wondering if she can get a colonoscopy and endoscopy while she is here.  I told her that that needs to be arranged by her PCP and will not be done in the hospital.  She will need to continue her GI work-up in the outpatient setting.  Case discussed with the observation team.    I have reviewed the nursing notes.    I have reviewed the findings, diagnosis, plan and need for follow up with the patient.  New Prescriptions    No medications on file     Final diagnoses:   Intractable chronic migraine without aura and without status migrainosus   IElias, am serving as a trained medical scribe to document services personally performed by Yosef Ac DO, based on the provider's statements to me.   Yosef HUTCHISON DO, was physically present and have reviewed and verified the accuracy of this note documented by Elias Gordon.     12/11/2019   [facility], [place], EMERGENCY DEPARTMENT     Yosef Ac DO  12/11/19 1545

## 2019-12-11 NOTE — CONSULTS
[facility]  Neurology Consultation    Patient Name:  Katharina Marcial  MRN:  [id number]    :  1980  Date of Service:  2019  Primary care provider:  Herminia De La Garza      Neurology consultation service was asked to see Katharina Marcial by Dr. Perera to evaluate for headache.    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  The patient reports that in the past she had tension type headache secondary to concussion, but since 2019 she has had a new headache type.  She reports that she had zoster ophthalmicus of the left eye, and subsequently developed stabbing headache pain that starts in the left occiput and radiates around the head to the left temporal area.  This occur several times throughout the day.  By the evening, things are much worse.  At home she does take Tylenol or ibuprofen, which she does take several doses of every day.  In the past she saw a general neurologist at [facility] who recommended that she get an occipital nerve block for occipital neuralgia, and she declined this intervention.  She had a brain MRI in May 2018 that was negative for any intracranial pathology by report.    Today, she went to establish care with neurology at [facility], but her abdominal pain was so severe that they sent her to the emergency department.  Today she is denying any photophobia, vision loss, dysphasia, focal numbness or paresthesias.    She received Reglan, Benadryl, magnesium, Toradol and her headache improved from a 10/10 to 8/10.    ROS  A 10-point ROS was performed as per HPI.   PMH  History reviewed. No pertinent past medical history.  History reviewed. No pertinent surgical history.    Medications   Medications Prior to  Admission   Medication Sig Dispense Refill Last Dose     Ascorbic Acid (VITAMIN C PO) Take 500 mg by mouth daily    Past Month at Unknown time     melatonin 5 MG tablet Take 5-10 mg by mouth nightly as needed   Past Month at Unknown time     Multiple Vitamins-Minerals (MULTIVITAMIN ADULT PO) Take 1 tablet by mouth daily    Past Month at Unknown time     Omega-3 Fatty Acids (OMEGA 3 PO) Take by mouth daily    Past Month at Unknown time     VITAMIN D, CHOLECALCIFEROL, PO Take 5,000 Units by mouth daily    Past Month at Unknown time     diazepam (VALIUM) 10 MG tablet Take 30 minutes before MRI scans 1 tablet 0 Unknown at Unknown time       Allergies  No Known Allergies    Social History  I have reviewed this patient's social history  ,   Family History    I have reviewed this patient's family history    Physical Examination   Vitals: /75 (BP Location: Right arm)   Pulse 75   Temp 98.4  F (36.9  C) (Oral)   Resp 16   Wt 71.2 kg (157 lb)   SpO2 100%   BMI 26.33 kg/m    General: Adult, in NAD, cooperative  HEENT: NC/AT, no icterus, op pink and moist,tender to palpation over left occipital nerves > right occipital nerves  Cardiac: RRR no M  Chest: CTAB no w/c/r  Abdomen: S/NT/ND  Extremities: No LE swelling.  Skin: No rash or lesion.   Psych: Mood pleasant, affect congruent  Neuro:  Mental status: Awake, alert, attentive, oriented. Speech is fluent, no dysarthria.  Cranial nerves: Eyes conjugate, PERRLA, EOMI, face symmetric, facial sensation intact, shoulder shrug strong, tongue/uvula midline. Boris reflex intact.  Motor: Tone within normal. No atrophy or twitches.       Right Left   Shoulder abduction:      5 5   Elbow Flexion: 5 5   Elbow Extension:            5 5   Wrist Extension:          5 5               5 5   Finger Flexion 5 5   Wrist Flexion 5 5   Hip Flexion 5 5   Knee Extension 5 5   Knee Flexion 5 5   Dorsiflexion 5 5   Plantar flexion 5 5     Reflexes:  Normoreflexic and symmetric biceps, patellar, and achilles. Toes down-going.  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  Her occipital neuralgia seems to be exacerbated in the setting of severe abdominal pain.  There are no new features to her headache type today, just more intense than usual.  She responded only minimally to interventions in the emergency department.  As a long-term solution, I do think that occipital nerve block will be very helpful for her.  However, in the short time it is reasonable to try a dose of IV dexamethasone.  If she responds to this, reasonable to discharge on a Medrol Dosepak.    Of note, I did review her care everywhere MRI brain report which does not reveal any intracranial pathology.  However have not been able to review these images on my own.  I will request that they be pushed over from [facility] for review.    # Occipital neuralgia  - IV Dexamethasone 4 mg x 1  - If beneficial, can repeat as needed, consider d\c on Medrol Dosepak  - Will request brain MRI   - Reasonable to continue giving fluids, and try compazine as well for headache if necessary    Thank you for involving neurology in the care of Katharina Marcial.  Please do not hesitate to call with questions/concerns.    This note was dictated with Arely, please excuse any errors in dictation.     Patient was discussed with Dr. Storm.    Odilia Veag DO  Neurology PGY4

## 2019-12-11 NOTE — PROGRESS NOTES
"Re: Katharina Marcial      MRN# [id number]  YOB: 1980  Date of Visit:12/11/2019     OUTPATIENT NEUROLOGY VISIT NOTE    Chief Complaint:  Headache evaluation    History of Present Illness  Katharina Marcial is a 39-year-old female presents to the clinic today for headache evaluation.   Accompanied to today's appointment by her mother.   History of Herpes zoster in January of 2018 and was treated with antiviral medications and rash improved in 3 days.   Left sided post herpetic neuralgia since January of 2019 and daily and worsening since December 1st, 2019. Pain is a constant throbbing and more with laying down with feeling of pressure in the left side of her head and 9/10 on the numeric pain scale. Patient reports that when pain gets to 10/10 she is throwing up.   Associated with light and noise sensitivity, nausea and vomiting daily. Patient reports worsening of head pain since December 1st and nothing seems work now. Patient tried medications for headache treatment but did not tolerate them -\"throwing up\"  Patient reports that the pain got worse after hospitalization on 12/2/2019 at [facility] for abdominal pain and recent right foot surgery complications. Patient and family are not happy with her recent hospitalization -patient remains in severe pain and no answers were provided locally (in [place]).   Patient reports that she has two problems- abdominal pain and headaches. Mother states that abdominal pain and abdominal symptoms were significant and patient needs her two to be seen for abdominal pain. Mother reports that the \"pain is not a depression\"  but an acute and new pain and patient in desperate need for help. Patient lost 9 pounds because of not able to eat and diarhea and \"peeing out her butt.\"  Primary care provider was involved but she does not know what to do per mother.   Neurodiagnostic Testing  MRI  EXAM:  MRI HEAD WITHOUT AND WITH CONTRAST    INDICATION:  Headache, chronic, " neuro deficit,Intractable acute post-traumatic headache    TECHNIQUE:  Multiplanar, multisequence magnetic resonance imaging of the brain is performed  without and with IV contrast administration.    CONTRAST:  15 mL IV Dotarem    COMPARISON:  None available.    FINDINGS:  There are no areas of restricted diffusion on diffusion-weighted images to  suggest an acute infarct.  The ventricles are normal size, shape, and contour  for a patient of this age.  No signal abnormality seen within the brain  parenchyma.  There is approximately 2 mm of inferior cerebellar tonsillar  ectopia, not meeting radiographic criteria for Chiari 1 malformation.  On the  postcontrast images, there is no abnormal enhancement intracranially.  There is  no evidence for acute intracranial hemorrhage, extra-axial fluid collection,  midline shift, intracranial mass, mass effect, hydrocephalus, or acute infarct.  Basilar cisterns are patent.  The major intracranial vascular flow voids are  present.  Mild right mastoid tip effusion is seen, which can be seen with  eustachian tube dysfunction or mastoiditis.  Left mastoid air cells are clear.  Paranasal sinuses are clear.    IMPRESSION:  1. No evidence for an acute intracranial abnormality.  No acute intracranial  hemorrhage, acute or subacute infarct, or intracranial mass.  2. 2 mm of inferior cerebellar tonsillar ectopia, not meeting radiographic  criteria for Chiari 1 malformation.  There is no edema the adjacent cerebellum  or brainstem.  3. Mild right mastoid tip effusion which can be seen with eustachian tube  dysfunction or mild mastoiditis.    EXAM:  MR BRAIN WITHOUT CONTRAST     ADDENDUM:  The patient received 5 mg of oral Diazepam.  The patient's O2 saturation and heart   rate were monitored throughout the procedure by an oximeter, and values remained within normal   limits.     ML:tb 1/07/18     Read by: Agustín Diaz M.D.     Electronically reviewed and signed by: Agustín Diaz M.D.      -------------------------------------- Original Report --------------------------------------     EXAM:  MR BRAIN WITHOUT CONTRAST     CLINICAL INFORMATION:  38-year-old female presents with chronic primary headaches, mostly   right-sided headache.     TECHNICAL INFORMATION:  Multisequence, multiplanar images of the brain were obtained without   contrast.     IV Contrast: None.      Sedation:  None.     COMPARISON:  Head CT from 2017.     INTERPRETATION:  Ventricles and sulci are normal in size and configuration for age.  Brain   signal intensity and morphology is normal.  Gray-white matter differentiation is normal. No   hemorrhage, mass, mass effect or hydrocephalus. No extra-axial masses or fluid collections.     Midline structures including the corpus callosum, pituitary gland, pineal region and the   craniovertebral junction and clivus are normal.     Expected intracranial flow voids.     The visualized orbits, paranasal sinuses, calvarium and mastoid air cells are normal.     IMPRESSION:       1.  Normal noncontrast brain MRI.     Read by: Agustín Diaz M.D.     Electronically reviewed and signed by: Agustín Diaz M.D.     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, has a small vegetable farm,  and  of 16 years and has 2 kids-13 and 11  Social History     Tobacco Use     Smoking status: Former Smoker     Types: Cigarettes     Last attempt to quit: 2002     Years since quittin.2      Smokeless tobacco: Never Used   Substance Use Topics     Alcohol use: No    reviewed and verified with the patient   No Known Allergies    Current Outpatient Medications   Medication Sig Dispense Refill     diazepam (VALIUM) 10 MG tablet Take 30 minutes before MRI scans 1 tablet 0     melatonin 5 MG tablet Take 5-10 mg by mouth nightly as needed       Ascorbic Acid (VITAMIN C PO) Take 500 mg by mouth daily        gabapentin (NEURONTIN) 100 MG capsule One at night. May increase to 2 or 3 at night if needed (Patient not taking: Reported on 12/11/2019) 90 capsule 1     Multiple Vitamins-Minerals (MULTIVITAMIN ADULT PO) Take 1 tablet by mouth daily        Omega-3 Fatty Acids (OMEGA 3 PO) Take by mouth daily        scopolamine (TRANSDERM) 72 hr patch Place 1 patch onto the skin every 72 hours       traZODone (DESYREL) 50 MG tablet Take 1 tablet by mouth nightly as needed  0     VITAMIN D, CHOLECALCIFEROL, PO Take 5,000 Units by mouth daily      reviewed and verified with the patient    Review of Systems:  A 12-point ROS including constitutional, eyes, ENT, respiratory, cardiovascular, gastroenterology, genitourinary, integumentary, musculoskeletal, neurology, hematology and psychiatric were all reviewed with the patient and completed at the Neuroscience Services Question nary and as mentioned in the HPI.     General Exam:   /75 (BP Location: Left arm, Patient Position: Sitting, Cuff Size: Adult Regular)   Pulse 107   Wt 71.6 kg (157 lb 14.4 oz)   SpO2 98%   BMI 26.48 kg/m    GEN: Awake, in acute distress; poor eye contact, crying, emotionally labile responses appropriately but reports an acute abdominal  pain and headache 10/10. Mother is in the room and tearful at times.   HEENT: Head atraumatic/Normocephalic. Scalp normal. Allodynia. Pupils equally round, 4 mm, reactive to light and accommodation, sclera and conjunctiva normal. Fundoscopic examination reveals normal vessels no papilledema.   Neck: Easily  moveable without resistance  Heart: S1/S2 appreciated, RRR, no m/r/g, no carotid bruits  Lungs:Lungs are clear to auscultation bilaterally, no wheezes or crackles.   Neurological Examination:  The patient is alert and oriented times four. Speech is fluent without dysarthria.   Cranial nerves:  CN I deferred.   CN II: Intact and full visual fields to confrontation bilaterally.   CN III, IV, VI: EOM intact. There is no nystagmus. Has conjugated gaze. Intact direct and consensual pupillary light reflexes.   CN V: Intact and symmetrical to facial sensation in the V1 through V3 bilaterally.   CN VII: Intact and symmetrical eyebrow and lid raise and eyelid closure, smiles and frown.   CN VIII: Intact to finger rub bilaterally.   CN IX and X: The palates elevates symmetrical. The uvula is midline.   CN XII: The tongue protrudes midline with no atrophy or fasciculations.   Motor exam: The patient has a normal bulk and tone throughout. Strength Exam:  5/5 strength at shoulder abduction, elbow flexion or extension, wrist flexion or extension, finger abduction, , hip flexion and extension, knee flexion and extension, and dorsiflexion and plantarflexion bilaterally.   Sensation is intact to light touch  throughout. Reflexes are symmetrical at biceps, triceps, brachioradialis, patellar, and Achilles.   Gait was not tested     Assessment and Plan:   Acute headache and abdominal pain. Patient is acute distress due reported acute headache and acute abdominal pain, dehydration, diarrhea and not able to hold any fluids or medications down and tachycardia.  Patient was directed to the ED for acute  symptoms evaluation.   Return to Headache Clinic for headache prevention when stable.   Discussed this patient with one of ED MD.      I discussed all my recommendation with Katharina Marcial and her mother. The patient verbalizes understanding and comfortable with the plan. Patient left our Clinic accompanied by her mother who will  take patient to ED.   Time spent with pt answering questions, discussing findings, counseling and coordinating care was more than 50% the appointment time, 36 minutes.         BEKAH Bazan, CNP  [facility]

## 2019-12-11 NOTE — PHARMACY-ADMISSION MEDICATION HISTORY
Admission medication history interview status for the 12/11/2019 admission is complete. See Epic admission navigator for allergy information, pharmacy, prior to admission medications and immunization status.     Medication history interview sources:  Patient     Changes made to PTA medication list (reason)  Added: none  Deleted: gabapentin, scopalamine  Changed: none    Additional medication history information (including reliability of information, actions taken by pharmacist): Patient reports not being able to take medications in weeks as she is unable to keep medications down and states she vomits them up.      Prior to Admission medications    Medication Sig Last Dose Taking? Auth Provider   Ascorbic Acid (VITAMIN C PO) Take 500 mg by mouth daily  Past Month at Unknown time Yes Reported, Patient   melatonin 5 MG tablet Take 5-10 mg by mouth nightly as needed Past Month at Unknown time Yes Reported, Patient   Multiple Vitamins-Minerals (MULTIVITAMIN ADULT PO) Take 1 tablet by mouth daily  Past Month at Unknown time Yes Reported, Patient   Omega-3 Fatty Acids (OMEGA 3 PO) Take by mouth daily  Past Month at Unknown time Yes Reported, Patient   VITAMIN D, CHOLECALCIFEROL, PO Take 5,000 Units by mouth daily  Past Month at Unknown time Yes Reported, Patient   diazepam (VALIUM) 10 MG tablet Take 30 minutes before MRI scans Unknown at Unknown time  Jose Guadalupe Waters MD       Medication history completed by: Bethanie Mcclellan, PharmD, BCPS

## 2019-12-11 NOTE — PATIENT INSTRUCTIONS
PLAN      Home Care Instructions:   If currently in counseling, call counselor for appointment  Call local crisis interventions  Contact friends or family for support  Increase exercise and enjoyable activities  East a well-balanced diet, drink plenty of fluids and rest as needed  Alcohol and other recreational drugs can worsen depression.  If heavy use of drugs or alcohol, contact counselor or PCP to help reduce consumption.    Report the following to your PCP:   Suicidal thoughts without a plan or means to carry out the plan  Depression interferes with daily activities  Persistent inability to sleep    Seek emergency care immediately if any of the following occur:   Suicidal thoughts and plan and means to carry out the plan  Injury to self or others  Altered mental status:  Confusion, Delusional, Pyschotic    BEHAVIORAL HEALTH TEAMS      [facility] [place] - Behavioral Health Team    [place] [facility] Pager: [phone]    [place]  - Behavioral Health Team    Pager number: [phone]    Referral to Behavioral Health    BEHAVIORAL / SPIR[facility]UAL HE[place] [facility] [[id number]}    RESOURCES

## 2019-12-11 NOTE — ED TRIAGE NOTES
"Katharina ambulatory to triage for c/o ongoing abd pain and headache; pt was at neuro clinic and pt states \"I was in too much pain and was told to come to the ER.\"  "

## 2019-12-11 NOTE — H&P
[facility], [place]    History and Physical - Emergency Department Observation Unit       Date of Admission:  12/11/2019    Assessment & Plan   Katharina Marcial is a 39 year old female admitted on 12/11/2019. She has a history of ileus/mild enteritis, IBS, migraines, and herpes zoster (in January 2018 treated with antiviral medication) who presents to the Emergency Department today for evaluation of abdominal pain      1. Zoster ophthalmicus of the left eye:    2. Intractable headache: She reports that she had zoster ophthalmicus of the left eye January of 2019, and subsequently developed stabbing headache pain that starts in the left occiput and radiates around the head to the left temporal area. Causes her to be nauseated. The patient reports the headache has worsened since her foot surgery on 11/1/19. Patient is able to keep some foods down such as baby food and rice crackers.  This occur several times throughout the day and into the evening. She takes Tylenol pm at night which takes the edge off and she is able to get a few hours of sleep at a time. She saw a general neurologist at [facility] who recommended that she get an occipital nerve block for occipital neuralgia, and she declined this intervention.   Patient was at neurology clinic today for further evaluation of this.  Patient had pulse noncontrasted and contrasted MRIs of the brain performed.1. No evidence for an acute intracranial abnormality.  No acute intracranial  hemorrhage, acute or subacute infarct, or intracranial mass. 2 mm of inferior cerebellar tonsillar ectopia, not meeting radiographic criteria for Chiari 1 malformation.  There is no edema the adjacent cerebellum or brainstem. Mild right mastoid tip effusion which can be seen with eustachian tube dysfunction or mild mastoiditis. Neurology saw patient in the ED and recommended: In the ED, she received IV Benadryl, Magnesium and Toradol with improvement of her  headache.  - IV Dexamethasone 4 mg x 1  - If beneficial, can repeat as needed, consider d\c on Medrol Dosepak  - Reasonable to continue giving fluids, and try compazine as well for headache if necessary  - Per patient neurology may do an occipital nerve block tomorow, they will continue to follow her tomorrow.   - Toradol prn  - Benadryl prn       3. Abdominal pain:  4. Nausea: Bunionectomy right foot on 11/1 and had been utilizing Hydrocodone with acetaminophen, as well as occasional cyclobenzaprine for pain. She had increasing abdominal pain and presented to [facility] ED in [place] from 12/2-12/3.  During this visit the patient had a CT performed of the abdomen and pelvis which showed fluid-filled loops of small and large bowel consistent with ileus or mild enteritis without any other abdominal findings. At discharge the patient's symptoms were thought to be a combination of constipation from narcotic use as well as irritable bowel syndrome.  She was given IV fluids, antiemetics, pain control  She started passing more flatus and was given additional dose of MiraLax with only clear watery stool with no increased stool frequency or viky diarrhea. Diet was advanced from clear to full liquids, then plan juice, slowly some solids. She did have some mild postprandial discomfort, nausea, but no emesis. Overall, symptoms improved. At discharge, she was recommended to follow a Modified diet with gradual elimination of possible offending foods, drink adequate water 6-8 glasses per day. Consider FOD MAP diet. She was given a prescription for Zofan, Levsin, Miralax as needed. She was recommended to follow up with her PCP if she was having continued symptoms. Patient did and did not find her PCP to be helpful. Patient was seen by neurology in clinic today and reported abdominal pain. Patient is not sure if the headache is causing the nausea and abdominal pain. She received a GI cocktail. Abdomen xray completed in the ED and  showed Nonobstructive bowel gas.  pattern. Patient reports no improvement after GI cocktail. Is requesting to eat upon admission. UA negative. Negative pregnancy test.     She has had gastroenterology consultation last year and has had extensive workup, including EGD with biopsy, colonoscopy, as well as normal esophagram. She had previously met with gastroenterology regarding this, as well as met with her dietitian and a FOD MAP diet.  - Antiemetics  - Regular diet/NPO at midnight   - Serial abdominal exams   Repeat CBC and BMP in am  - IVF   - GI consult in am  - Stool studies given recent hospital stay.   - Enteric Isolation    5. Bunionectomy right foot on 11/1: Currently in a CAM boot. Incision is CDI. Per Ortho follow up note on 11/5/19, patient is to continue utilizing the Ace bandage and shoe while walking over the next several weeks  - Continue Ace bandage and CAM boot  - Tylenol prn    6. Insomnia: reports Melatonin is not effective.  - Trial Restoril     Diet: Regular diet/NPO at midnightDVT Prophylaxis: Low Risk/Ambulatory with no VTE prophylaxis indicated  Douglas Catheter: not present      Disposition Plan   Expected discharge: Tomorrow, recommended to prior living arrangement once adequate pain management/ tolerating PO medications.  Entered: BEKAH Wagner CNP 12/11/2019, 4:27 PM     The patient's care was discussed with the Patient     BEKAH Llamas, NP  Emergency Department  [phone] Ex [id number]  ______________________________________________________________________    Chief Complaint   Abdominal pain and headache    History is obtained from the patient    History of Present Illness   Katharina Marcial is a 39 year old female with a history of ileus/mild enteritis, IBS, migraines, and herpes zoster (in January 2018 treated with antiviral medication) who presents to the Emergency Department today for evaluation of abdominal pain.  Per chart review, the patient had been seen [facility] ED in  [place] from 12/2-12/3 for complaints of the same symptoms.  During this visit the patient had a CT performed of the abdomen and pelvis which showed fluid-filled loops of small and large bowel consistent with ileus or mild enteritis without any other abdominal findings.  During this visit, the patient was also noted to be on narcotics for a previous right foot injury.  At discharge the patient's symptoms were thought to be a combination of constipation from narcotic use as well as irritable bowel syndrome.     Today, patient reports that she has continued to have significant abdominal pain and headache.  The patient has been having issues with headaches since the beginning of this year.  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  Patient was at neurology clinic today for further evaluation of this.  Patient had pulse noncontrasted and contrasted MRIs of the brain performed (impressions below HPI).  She also reported having abdominal pain and diarrhea there and was referred to the ED for further evaluation.  Here, she reports this abdominal pain to be in her left upper quadrant radiating through her left side into her left flank pain.  As noted above, the patient was seen for this in [place] ED; however, the patient and her mother are not satisfied with her visit and presents to our ED for further evaluation as her symptoms have not improved.  The patient reports that she is currently only eating soft foods such as baby food.  She reports that when she tries to eat solid food she has vomiting.  Likewise, she has not been able to take medications as she will subsequently vomit.  The patient is not currently taking any pain medication for her headache or abdominal pain.  The patient otherwise reports having chills at night.  She denies fever or blood in stool.  The patient otherwise reports a partial hysterectomy in 2017 as well as hernia repair in 2013.     MRI brain  with contrast  IMPRESSION:  1. No evidence for an acute intracranial abnormality.  No acute intracranial  hemorrhage, acute or subacute infarct, or intracranial mass.  2. 2 mm of inferior cerebellar tonsillar ectopia, not meeting radiographic  criteria for Chiari 1 malformation.  There is no edema the adjacent cerebellum  or brainstem.  3. Mild right mastoid tip effusion which can be seen with eustachian tube  dysfunction or mild mastoiditis.     MRI brain w/o contrast performed   IMPRESSION:     1.  Normal noncontrast brain MRI.        Review of Systems    The 10 point Review of Systems is negative other than noted in the HPI or here. Abdominal pain, nausea, headaches.     Past Medical History    I have reviewed this patient's medical history and updated it with pertinent information if needed.   History reviewed. No pertinent past medical history.    Past Surgical History   I have reviewed this patient's surgical history and updated it with pertinent information if needed.  History reviewed. No pertinent surgical history.    Social History   I have reviewed this patient's social history and updated it with pertinent information if needed.  Social History     Tobacco Use     Smoking status: Former Smoker     Types: Cigarettes     Last attempt to quit: 2002     Years since quittin.2     Smokeless tobacco: Never Used   Substance Use Topics     Alcohol use: No     Drug use: No       Family History   I have reviewed this patient's family history and updated it with pertinent information if needed.       Prior to Admission Medications   Prior to Admission Medications   Prescriptions Last Dose Informant Patient Reported? Taking?   Ascorbic Acid (VITAMIN C PO)   Yes No   Sig: Take 500 mg by mouth daily    Multiple Vitamins-Minerals (MULTIVITAMIN ADULT PO)   Yes No   Sig: Take 1 tablet by mouth daily    Omega-3 Fatty Acids (OMEGA 3 PO)   Yes No   Sig: Take by mouth daily    VITAMIN D, CHOLECALCIFEROL, PO   Yes No    Sig: Take 5,000 Units by mouth daily    diazepam (VALIUM) 10 MG tablet   No No   Sig: Take 30 minutes before MRI scans   melatonin 5 MG tablet   Yes No   Sig: Take 5-10 mg by mouth nightly as needed   scopolamine (TRANSDERM) 72 hr patch   Yes No   Sig: Place 1 patch onto the skin every 72 hours      Facility-Administered Medications: None     Allergies   No Known Allergies    Physical Exam   Vital Signs: Temp: 99.1  F (37.3  C) Temp src: Oral BP: 116/69 Pulse: 80 Heart Rate: 101 Resp: 16 SpO2: 100 % O2 Device: None (Room air)    Weight: 157 lbs 0 oz    Constitutional: healthy, alert and no distress   Head: Normocephalic. No masses, lesions, tenderness or abnormalities   Neck: Neck supple. No adenopathy. Thyroid symmetric, normal size,, Carotids without bruits.   ENT: ENT exam normal, no neck nodes or sinus tenderness   Cardiovascular: RRR. No murmurs, clicks gallops or rub   Respiratory: . Good diaphragmatic excursion. Lungs clear   Gastrointestinal: Abdomen soft, pain with palpation of epigastric area, left upper quadrant and left flank pain. BS normal. No masses, organomegaly.   : Deferred   Musculoskeletal: extremities normal- no gross deformities noted, gait normal and normal muscle tone   Skin: no suspicious lesions or rashes   Neurologic: Gait normal. Reflexes normal and symmetric. Sensation grossly WNL.   Psychiatric: mentation appears normal and affect normal/bright   Hematologic/Lymphatic/Immunologic: normal ant/post cervical, axillary, supraclavicular and inguinal     Data   Data reviewed today: I reviewed all medications, new labs and imaging results over the last 24 hours.   Recent Labs   Lab 12/11/19  1302   WBC 7.3   HGB 14.5   MCV 98         POTASSIUM 4.4   CHLORIDE 107   CO2 26   BUN 10   CR 0.58   ANIONGAP 6   KAELA 9.2   GLC 87   ALBUMIN 4.3   PROTTOTAL 8.1   BILITOTAL 0.6   ALKPHOS 67   ALT 25   AST 24   LIPASE 76     Most Recent 3 CBC's:  Recent Labs   Lab Test 12/11/19  1302   WBC  7.3   HGB 14.5   MCV 98        Most Recent 3 BMP's:  Recent Labs   Lab Test 12/11/19  1302 07/24/17     --    POTASSIUM 4.4 4.0   CHLORIDE 107  --    CO2 26  --    BUN 10  --    CR 0.58 0.60   ANIONGAP 6  --    KAELA 9.2  --    GLC 87 93     Most Recent 2 LFT's:  Recent Labs   Lab Test 12/11/19  1302 07/24/17   AST 24 16   ALT 25 18   ALKPHOS 67  --    BILITOTAL 0.6  --      Recent Results (from the past 24 hour(s))   XR Abdomen 2 Views    Narrative    EXAM: XR ABDOMEN 2 VW  12/11/2019 1:48 PM     HISTORY:  diffuse pain, nausea, hx of recent ileus, r/o sbo       COMPARISON:  None    FINDINGS:   Supine and upright abdominal radiographs were obtained.   Nonobstructive bowel gas pattern. No portal venous gas, pneumatosis or  free air in the abdomen. No suspicious osseous lesions.The lung bases  are clear.      Impression    IMPRESSION: Nonobstructive bowel gas pattern.     I have personally reviewed the examination and initial interpretation  and I agree with the findings.    LANCE RODRIGUEZ, DO

## 2019-12-11 NOTE — NURSING NOTE
"[facility]:  PHQ-9 Screening Note    SITUATION/BACKGROUND                                                    Katharina Marcial is a 39 year old female who completed the PHQ-9 assessment for depression and Score is >9.    Onset of symptoms: worsening  Trigger: garry; no answers  Recent related events: health  Prior history of suicide attempt or self harm: No     Risk Factors: anxiety and comorbid medical condition of pain, shingles  History of depression or mental illness: Yes  Medications reviewed: Yes     ASSESSMENT      A. Are any of the following present?      Suicidal thoughts with a plan and means to carry out the plan?    Intent to harm others    Altered mental status: confusion, delusional, psychotic       NO - go to B   B. Are any of the following present?      Suicidal thoughts without a plan or means to carry out the plan    New onset of delusional ideas    Past inpatient admission for depression    New onset and recent change or addition of new medication YES  - Patient should receive crises care within 2-4 hours. Offer emergency room care or connect with any of the *crisis resources.     Place referral to behavioral health team for \"regular\" follow-up.   C. Are any of the following present?      Previous suicide attempts    Depression interfering with ability to work or function    Loss of appetite and eating poorly    Abrupt cessation of drugs (OTC or RX), alcohol or caffeine    Drug or alcohol abuse YES -  [facility] behavior health team. If no response, patient should receive crisis care within 24 hours.     Place referral to behavioral health team for \"regular\" follow-up.      D. Are several of the following present?      Difficulty concentrating    Difficulty sleeping    Reduced interest in sexual activity or impotency    Irregular or absent menstruation    No interest in activity    Change in interpersonal relationships    Increased use/abuse of alcohol or drugs    Pregnant or recent " "child birth    Recent major life change    History of depression YES -  Follow-up with PCP for appointment and follow home care instructions.    Place referral to behavioral health team for \"regular\" follow-up.               PLAN      Home Care Instructions:   If currently in counseling, call counselor for appointment  Call local crisis interventions  Contact friends or family for support  Increase exercise and enjoyable activities  East a well-balanced diet, drink plenty of fluids and rest as needed  Alcohol and other recreational drugs can worsen depression.  If heavy use of drugs or alcohol, contact counselor or PCP to help reduce consumption.    Report the following to your PCP:   Suicidal thoughts without a plan or means to carry out the plan  Depression interferes with daily activities  Persistent inability to sleep    Seek emergency care immediately if any of the following occur:   Suicidal thoughts and plan and means to carry out the plan  Injury to self or others  Altered mental status:  Confusion, Delusional, Pyschotic    BEHAVIORAL HEALTH TEAMS      [facility]    [facility] Pager: [phone]    [place]  - Behavioral Health Team    Pager number: [phone]    Referral to Behavioral Health    BEHAVIORAL / SPIRITUAL HEALTH SOWQ [[id number]}    RESOURCES        Arelis Arriola RN        Copyright 2016 [employer]      "

## 2019-12-11 NOTE — LETTER
"12/11/2019       RE: Katharina Marcial  [address] [place] [address]     Dear Colleague,    Thank you for referring your patient, Katharina Marcial, to the [facility] NEUROLOGY at [facility]. Please see a copy of my visit note below.    Re: Katharina Marcial      MRN# [id number]  YOB: 1980  Date of Visit:12/11/2019     OUTPATIENT NEUROLOGY VISIT NOTE    Chief Complaint:  Headache evaluation    History of Present Illness  Katharina Marcial is a 39-year-old female presents to the clinic today for headache evaluation.   Accompanied to today's appointment by her mother.   History of Herpes zoster in January of 2018 and was treated with antiviral medications and rash improved in 3 days.   Left sided post herpetic neuralgia since January of 2019 and daily and worsening since December 1st, 2019. Pain is a constant throbbing and more with laying down with feeling of pressure in the left side of her head and 9/10 on the numeric pain scale. Patient reports that when pain gets to 10/10 she is throwing up.   Associated with light and noise sensitivity, nausea and vomiting daily. Patient reports worsening of head pain since December 1st and nothing seems work now. Patient tried medications for headache treatment but did not tolerate them -\"throwing up\"  Patient reports that the pain got worse after hospitalization on 12/2/2019 at [facility] for abdominal pain and recent right foot surgery complications. Patient and family are not happy with her recent hospitalization -patient remains in severe pain and no answers were provided locally (in [place]).   Patient reports that she has two problems- abdominal pain and headaches. Mother states that abdominal pain and abdominal symptoms were significant and patient needs her two to be seen for abdominal pain. Mother reports that the \"pain is not a depression\"  but an acute and new pain and patient in desperate need for " "help. Patient lost 9 pounds because of not able to eat and diarhea and \"peeing out her butt.\"  Primary care provider was involved but she does not know what to do per mother.   Neurodiagnostic Testing  MRI  EXAM:  MRI HEAD WITHOUT AND WITH CONTRAST    INDICATION:  Headache, chronic, neuro deficit,Intractable acute post-traumatic headache    TECHNIQUE:  Multiplanar, multisequence magnetic resonance imaging of the brain is performed  without and with IV contrast administration.    CONTRAST:  15 mL IV Dotarem    COMPARISON:  None available.    FINDINGS:  There are no areas of restricted diffusion on diffusion-weighted images to  suggest an acute infarct.  The ventricles are normal size, shape, and contour  for a patient of this age.  No signal abnormality seen within the brain  parenchyma.  There is approximately 2 mm of inferior cerebellar tonsillar  ectopia, not meeting radiographic criteria for Chiari 1 malformation.  On the  postcontrast images, there is no abnormal enhancement intracranially.  There is  no evidence for acute intracranial hemorrhage, extra-axial fluid collection,  midline shift, intracranial mass, mass effect, hydrocephalus, or acute infarct.  Basilar cisterns are patent.  The major intracranial vascular flow voids are  present.  Mild right mastoid tip effusion is seen, which can be seen with  eustachian tube dysfunction or mastoiditis.  Left mastoid air cells are clear.  Paranasal sinuses are clear.    IMPRESSION:  1. No evidence for an acute intracranial abnormality.  No acute intracranial  hemorrhage, acute or subacute infarct, or intracranial mass.  2. 2 mm of inferior cerebellar tonsillar ectopia, not meeting radiographic  criteria for Chiari 1 malformation.  There is no edema the adjacent cerebellum  or brainstem.  3. Mild right mastoid tip effusion which can be seen with eustachian tube  dysfunction or mild mastoiditis.    EXAM:  MR BRAIN WITHOUT CONTRAST     ADDENDUM:  The patient received " 5 mg of oral Diazepam.  The patient's O2 saturation and heart   rate were monitored throughout the procedure by an oximeter, and values remained within normal   limits.     ML:tb 1/07/18     Read by: Agustín Diaz M.D.     Electronically reviewed and signed by: Agustín Diaz M.D.     -------------------------------------- Original Report --------------------------------------     EXAM:  MR BRAIN WITHOUT CONTRAST     CLINICAL INFORMATION:  38-year-old female presents with chronic primary headaches, mostly   right-sided headache.     TECHNICAL INFORMATION:  Multisequence, multiplanar images of the brain were obtained without   contrast.     IV Contrast: None.      Sedation:  None.     COMPARISON:  Head CT from 11/26/2017.     INTERPRETATION:  Ventricles and sulci are normal in size and configuration for age.  Brain   signal intensity and morphology is normal.  Gray-white matter differentiation is normal. No   hemorrhage, mass, mass effect or hydrocephalus. No extra-axial masses or fluid collections.     Midline structures including the corpus callosum, pituitary gland, pineal region and the   craniovertebral junction and clivus are normal.     Expected intracranial flow voids.     The visualized orbits, paranasal sinuses, calvarium and mastoid air cells are normal.     IMPRESSION:       1.  Normal noncontrast brain MRI.     Read by: Agustín Diaz M.D.     Electronically reviewed and signed by: Agustín Diaz M.D.     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, has a small vegetable farm,  and  of 16 years and has 2 kids-13 and 11  Social History     Tobacco Use     Smoking status: Former Smoker     Types: Cigarettes     Last attempt to quit: 2002     Years since quittin.2     Smokeless tobacco: Never Used   Substance Use Topics     Alcohol use: No    reviewed and verified with the patient   No Known Allergies    Current Outpatient Medications   Medication Sig Dispense Refill     diazepam (VALIUM) 10 MG tablet Take 30 minutes before MRI scans 1 tablet 0     melatonin 5 MG tablet Take 5-10 mg by mouth nightly as needed       Ascorbic Acid (VITAMIN C PO) Take 500 mg by mouth daily        gabapentin (NEURONTIN) 100 MG capsule One at night. May increase to 2 or 3 at night if needed (Patient not taking: Reported on 2019) 90 capsule 1     Multiple Vitamins-Minerals (MULTIVITAMIN ADULT PO) Take 1 tablet by mouth daily        Omega-3 Fatty Acids (OMEGA 3 PO) Take by mouth daily        scopolamine (TRANSDERM) 72 hr patch Place 1 patch onto the skin every 72 hours       traZODone (DESYREL) 50 MG tablet Take 1 tablet by mouth nightly as needed  0     VITAMIN D, CHOLECALCIFEROL, PO Take 5,000 Units by mouth daily      reviewed and verified with the patient    Review of Systems:  A 12-point ROS including constitutional, eyes, ENT, respiratory, cardiovascular, gastroenterology, genitourinary, integumentary, musculoskeletal, neurology, hematology and psychiatric were all reviewed with the patient and completed at the [facility] [place] and as mentioned in the HPI.     General Exam:   /75 (BP Location: Left arm, Patient Position: Sitting, Cuff Size: Adult Regular)   Pulse 107   Wt 71.6 kg (157 lb 14.4 oz)   SpO2 98%   BMI 26.48 kg/m     GEN: Awake, in acute distress; poor eye contact, crying, emotionally labile responses appropriately but reports an acute abdominal  pain and headache 10/10.  Mother is in the room and tearful at times.   HEENT: Head atraumatic/Normocephalic. Scalp normal. Allodynia. Pupils equally round, 4 mm, reactive to light and accommodation, sclera and conjunctiva normal. Fundoscopic examination reveals normal vessels no papilledema.   Neck: Easily moveable without resistance  Heart: S1/S2 appreciated, RRR, no m/r/g, no carotid bruits  Lungs:Lungs are clear to auscultation bilaterally, no wheezes or crackles.   Neurological Examination:  The patient is alert and oriented times four. Speech is fluent without dysarthria.   Cranial nerves:  CN I deferred.   CN II: Intact and full visual fields to confrontation bilaterally.   CN III, IV, VI: EOM intact. There is no nystagmus. Has conjugated gaze. Intact direct and consensual pupillary light reflexes.   CN V: Intact and symmetrical to facial sensation in the V1 through V3 bilaterally.   CN VII: Intact and symmetrical eyebrow and lid raise and eyelid closure, smiles and frown.   CN VIII: Intact to finger rub bilaterally.   CN IX and X: The palates elevates symmetrical. The uvula is midline.   CN XII: The tongue protrudes midline with no atrophy or fasciculations.   Motor exam: The patient has a normal bulk and tone throughout. Strength Exam:  5/5 strength at shoulder abduction, elbow flexion or extension, wrist flexion or extension, finger abduction, , hip flexion and extension, knee flexion and extension, and dorsiflexion and plantarflexion bilaterally.   Sensation is intact to light touch  throughout. Reflexes are symmetrical at biceps, triceps, brachioradialis, patellar, and Achilles.   Gait was not tested     Assessment and Plan:   Acute headache and abdominal pain. Patient is acute distress due reported acute headache and acute abdominal pain, dehydration, diarrhea and not able to hold any fluids or medications down and tachycardia.  Patient was directed to the ED for acute  symptoms evaluation.   Return to Headache Clinic for  headache prevention when stable.   Discussed this patient with one of ED MD.      I discussed all my recommendation with Katharina Marcial and her mother. The patient verbalizes understanding and comfortable with the plan. Patient left our Clinic accompanied by her mother who will take patient to ED.   Time spent with pt answering questions, discussing findings, counseling and coordinating care was more than 50% the appointment time, 36 minutes.     BEKAH Bazan, CNP  [facility]

## 2019-12-11 NOTE — ED NOTES
[facility] [place]   ED Nurse to Floor Handoff     Katharina Marcial is a 39 year old female who speaks English and lives with others,  in a home  They arrived in the ED by car from clinic    ED Chief Complaint: Abdominal Pain    ED Dx;   Final diagnoses:   Intractable chronic migraine without aura and without status migrainosus         Needed?: No    Allergies: No Known Allergies.  Past Medical Hx: History reviewed. No pertinent past medical history.   Baseline Mental status: WDL  Current Mental Status changes: at basesline    Infection present or suspected this encounter: no  Sepsis suspected: No  Isolation type: No active isolations     Activity level - Baseline/Home:  Independent  Activity Level - Current:   Stand with Assist    Bariatric equipment needed?: No    In the ED these meds were given:   Medications   0.9% sodium chloride BOLUS (1,000 mLs Intravenous New Bag 12/11/19 1357)     Followed by   sodium chloride 0.9% infusion (has no administration in time range)   magnesium sulfate 4 g in 100 mL sterile water (premade) (4 g Intravenous New Bag 12/11/19 1533)   ketorolac (TORADOL) injection 15 mg (15 mg Intravenous Given 12/11/19 1408)   metoclopramide (REGLAN) injection 5 mg (5 mg Intravenous Given 12/11/19 1414)   diphenhydrAMINE (BENADRYL) injection 25 mg (25 mg Intravenous Given 12/11/19 1407)   lidocaine (XYLOCAINE) 2 % 15 mL, alum & mag hydroxide-simethicone (MYLANTA ES/MAALOX  ES) 15 mL GI Cocktail (30 mLs Oral Given 12/11/19 1509)   acetaminophen (TYLENOL) tablet 1,000 mg (1,000 mg Oral Given 12/11/19 1508)       Drips running?  No    Home pump  No    Current LDAs  Peripheral IV 12/11/19 Right Hand (Active)   Site Assessment WDL 12/11/2019  1:57 PM   Line Status Saline locked 12/11/2019  1:57 PM   Number of days: 0       Labs results:   Labs Ordered and Resulted from Time of ED Arrival Up to the Time of Departure from the ED   CBC WITH PLATELETS DIFFERENTIAL    COMPREHENSIVE METABOLIC PANEL   LIPASE   UA MACROSCOPIC WITH REFLEX TO MICRO AND CULTURE   HCG QUALITATIVE URINE   MAGNESIUM   PERIPHERAL IV CATHETER       Imaging Studies:   Recent Results (from the past 24 hour(s))   XR Abdomen 2 Views    Narrative    EXAM: XR ABDOMEN 2 VW  12/11/2019 1:48 PM     HISTORY:  diffuse pain, nausea, hx of recent ileus, r/o sbo       COMPARISON:  None    FINDINGS:   Supine and upright abdominal radiographs were obtained.   Nonobstructive bowel gas pattern. No portal venous gas, pneumatosis or  free air in the abdomen. No suspicious osseous lesions.The lung bases  are clear.      Impression    IMPRESSION: Nonobstructive bowel gas pattern.     I have personally reviewed the examination and initial interpretation  and I agree with the findings.    LANCE RODRIGUEZ, DO       Recent vital signs:   /58   Pulse 72   Temp 99.1  F (37.3  C) (Oral)   Resp 16   Wt 71.2 kg (157 lb)   SpO2 99%   BMI 26.33 kg/m      [place] Coma Scale Score: 15 (12/11/19 1237)       Cardiac Rhythm: Normal Sinus  Pt needs tele? No  Skin/wound Issues: Recent lower extremity surgery; boot in place    Code Status: Full Code    Pain control: fair    Nausea control: good    Abnormal labs/tests/findings requiring intervention: NA    Family present during ED course? Yes   Family Comments/Social Situation comments: Mother and father at bedside    Tasks needing completion: None    Bright Roberson, RN  [phone] [facility]

## 2019-12-12 ENCOUNTER — OFFICE VISIT (OUTPATIENT)
Dept: NEUROLOGY | Facility: CLINIC | Age: 39
End: 2019-12-12
Payer: COMMERCIAL

## 2019-12-12 ENCOUNTER — TELEPHONE (OUTPATIENT)
Dept: NEUROLOGY | Facility: CLINIC | Age: 39
End: 2019-12-12

## 2019-12-12 ENCOUNTER — TELEPHONE (OUTPATIENT)
Dept: ANESTHESIOLOGY | Facility: CLINIC | Age: 39
End: 2019-12-12

## 2019-12-12 VITALS
RESPIRATION RATE: 16 BRPM | BODY MASS INDEX: 26.33 KG/M2 | TEMPERATURE: 98.4 F | HEART RATE: 75 BPM | SYSTOLIC BLOOD PRESSURE: 110 MMHG | OXYGEN SATURATION: 100 % | WEIGHT: 157 LBS | DIASTOLIC BLOOD PRESSURE: 64 MMHG

## 2019-12-12 DIAGNOSIS — G43.719 INTRACTABLE CHRONIC MIGRAINE WITHOUT AURA AND WITHOUT STATUS MIGRAINOSUS: ICD-10-CM

## 2019-12-12 DIAGNOSIS — M54.81 BILATERAL OCCIPITAL NEURALGIA: Primary | ICD-10-CM

## 2019-12-12 LAB
INTERPRETATION ECG - MUSE: NORMAL
TSH SERPL DL<=0.005 MIU/L-ACNC: 1.04 MU/L (ref 0.4–4)

## 2019-12-12 PROCEDURE — 99217 ZZC OBSERVATION CARE DISCHARGE: CPT | Mod: Z6 | Performed by: NURSE PRACTITIONER

## 2019-12-12 PROCEDURE — 96376 TX/PRO/DX INJ SAME DRUG ADON: CPT

## 2019-12-12 PROCEDURE — 96368 THER/DIAG CONCURRENT INF: CPT

## 2019-12-12 PROCEDURE — 25800030 ZZH RX IP 258 OP 636: Performed by: STUDENT IN AN ORGANIZED HEALTH CARE EDUCATION/TRAINING PROGRAM

## 2019-12-12 PROCEDURE — 96361 HYDRATE IV INFUSION ADD-ON: CPT

## 2019-12-12 PROCEDURE — 25000128 H RX IP 250 OP 636: Performed by: NURSE PRACTITIONER

## 2019-12-12 PROCEDURE — 96366 THER/PROPH/DIAG IV INF ADDON: CPT

## 2019-12-12 PROCEDURE — 25000125 ZZHC RX 250: Performed by: STUDENT IN AN ORGANIZED HEALTH CARE EDUCATION/TRAINING PROGRAM

## 2019-12-12 PROCEDURE — G0378 HOSPITAL OBSERVATION PER HR: HCPCS

## 2019-12-12 PROCEDURE — 25800030 ZZH RX IP 258 OP 636: Performed by: NURSE PRACTITIONER

## 2019-12-12 RX ORDER — BUPIVACAINE HYDROCHLORIDE 5 MG/ML
8 INJECTION, SOLUTION PERINEURAL ONCE
Status: DISCONTINUED | OUTPATIENT
Start: 2019-12-12 | End: 2019-12-12

## 2019-12-12 RX ORDER — BUPIVACAINE HYDROCHLORIDE 5 MG/ML
8 INJECTION, SOLUTION EPIDURAL; INTRACAUDAL ONCE
Status: COMPLETED | OUTPATIENT
Start: 2019-12-12 | End: 2019-12-12

## 2019-12-12 RX ORDER — TRIAMCINOLONE ACETONIDE 40 MG/ML
40 INJECTION, SUSPENSION INTRA-ARTICULAR; INTRAMUSCULAR ONCE
Status: COMPLETED | OUTPATIENT
Start: 2019-12-12 | End: 2019-12-12

## 2019-12-12 RX ADMIN — SODIUM CHLORIDE 1000 ML: 9 INJECTION, SOLUTION INTRAVENOUS at 00:59

## 2019-12-12 RX ADMIN — TRIAMCINOLONE ACETONIDE 40 MG: 40 INJECTION, SUSPENSION INTRA-ARTICULAR; INTRAMUSCULAR at 18:22

## 2019-12-12 RX ADMIN — BUPIVACAINE HYDROCHLORIDE 40 MG: 5 INJECTION, SOLUTION EPIDURAL; INTRACAUDAL at 18:21

## 2019-12-12 RX ADMIN — KETOROLAC TROMETHAMINE 15 MG: 30 INJECTION, SOLUTION INTRAMUSCULAR; INTRAVENOUS at 10:08

## 2019-12-12 RX ADMIN — SODIUM CHLORIDE 1000 ML: 9 INJECTION, SOLUTION INTRAVENOUS at 08:54

## 2019-12-12 RX ADMIN — VALPROATE SODIUM 500 MG: 100 INJECTION, SOLUTION INTRAVENOUS at 12:05

## 2019-12-12 NOTE — PROGRESS NOTES
Pt AVS discussed with patient. All questions answered. Pt verbalized understanding.PIV removed. All belongings with patient. Pt ambulated to main lobby with mother. Pt discharged.

## 2019-12-12 NOTE — TELEPHONE ENCOUNTER
I spoke with Lexus. She let me know that Katharina is getting discharged from the hospital. I placed her on Dr. Garcia schedule for 5:30p tonight and notified provider. She will come here from hospital then discharge home with her mother.     Arelis ARGUELLES

## 2019-12-12 NOTE — TELEPHONE ENCOUNTER
I called pt mother Lexus and let her know Dr. Garcia is willing to do an occipital nerve block for Katharina. Katharina is still in the hospital. Lexus is going to see if she can find out when pt may be discharged. I let her know Dr Garcia is in clinic today until 5:30p and that she offered to come to hospital tomorrow morning at 6am. Lexus will call me back and we will determine a plan for kathairna to get her nerve block.     Arelis ARGUELLES

## 2019-12-12 NOTE — PLAN OF CARE
Observation goals PRIOR TO DISCHARGE    Comments: - Nausea improved- not met, still intermittent nausea  - Headache improved- not met  - GI consult complete- not met  - Neurology consult complete- met, neurology saw pt in ED.       Pt alert and oriented x 4. C/o of headache behind light eye, no relief from PRN pain medications. Also LUQ abdominal pain, intermittent nausea. PRN antiemetics as needed. Tolerated chicken broth. IVF running at 125 ml/hr. Pt on enteric precautions, no BM, stool sample still needs to be collected. Will continue to monitor.       Nurse to notify provider when observation goals have been met and patient is ready for discharge.

## 2019-12-12 NOTE — PROGRESS NOTES
Brief Neurology Progress Note    Interval events: patient initially reported to RN that dex was helpful for headache but this morning states it was ineffective. She continues to experience focal left occipital headache radiating to the left frontal region. No new features. She is quite anxious and worried about her brain MRI.    Objective:  Vital signs reviewed, stable  Mental status: Awake, alert, attentive, oriented. Speech is fluent, no dysarthria.  Cranial nerves: Eyes conjugate, PERRLA, EOMI, face symmetric, facial sensation intact, shoulder shrug strong, tongue/uvula midline. Boris reflex intact.  Motor: Full strength in all 4 extremities.    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 Her occipital neuralgia seems to be exacerbated in the setting of severe abdominal pain.  He has no new features by history or exam that concern me for another headache type.  I personally reviewed the 2 most recent brain MRIs, 1 of which was obtained in May several months after the onset of occipital neuralgia-they are both normal.    With regard to analgesia, she responded only minimally to Reglan, Benadryl, Toradol, magnesium, fluids and dexamethasone 4 mg IV.  The only other agent to consider while inpatient is IV Depakote and/or Compazine. As a long-term solution, I do think that occipital nerve block will be very helpful for her.  Would be happy to see her back in the neurology clinic with a headache specialist for this procedure, but according to review of care everywhere notes, this can be done locally as well.    # Occipital neuralgia  -Depakote 500 mg IV, fast push, ordered for you  -Reasonable to discharge with a Medrol  Dosepak  -Follow-up with either the headache clinic at [facility], or with general neurology at home  -Strongly recommend outpatient pain consultation    Patient seen and examined with Dr. Storm.    Odilia Vega DO on 12/12/2019 at 11:16 AM

## 2019-12-12 NOTE — PLAN OF CARE
Observation goals PRIOR TO DISCHARGE     Comments: - Nausea improved YES  - Headache improved NO  - GI consult complete NO  - Neurology consult complete NO  Nurse to notify provider when observation goals have been met and patient is ready for discharge.        Pt is calm and cooperative, mother at bedside and expressing frustration over lack of progress from a neuro standpoint. Pt hasn't been eating much but was able to get some sleep overnight. Nurse will continue to monitor.

## 2019-12-12 NOTE — TELEPHONE ENCOUNTER
Health Call Center    Phone Message    May a detailed message be left on voicemail: yes    Reason for Call: Other: NP Viky Sosa at [facility] is referring this pt for an occipital nerve block injection. Orders are in and wanting to just schedule procedure without consult. Time frame is 1 week if at all possible, please call Viky at [phone] to schedule as well as if there are any questions. Thanks!     Action Taken: Message routed to:  [facility] ([facility] Clinic

## 2019-12-12 NOTE — PLAN OF CARE
Observation goals PRIOR TO DISCHARGE     Comments: - Nausea improved YES  - Headache improved PENDING  - GI consult complete YES  - Neurology consult complete YES  Nurse to notify provider when observation goals have been met and patient is ready for discharge.        Team and consults in agreement for OUTPT follow up. Waiting on depacon from pharmacy for HA.

## 2019-12-12 NOTE — LETTER
12/12/2019       RE: Katharina Marcial  [address]  [place] [address]     Dear Colleague,    Thank you for referring your patient, Katharina Marcial, to the [facility] NEUROLOGY at [facility]. Please see a copy of my visit note below.    [facility]  Neurology Procedure  12/12/19     Katharina Marcial MRN# [id number]   YOB: 1980 Age: 39 year old            Occipital Nerve Block Procedure Note   DIAGNOSIS  Occipital neuralgia, bilateral     PROCEDURE   bilateral, Greater and Lesser occipital nerve blocks.      INFORMED CONSENT  I discussed the risks, benefits, alternatives, and the necessity of other members of the healthcare team participating in the procedure with the patient.  The patient understood and wished to proceed with the procedure.    PROCEDURAL PAUSE   Patient identity, procedure to be performed, correct side and site, patient position, availability of equipment, and special requirements were verified.      PROCEDURE DETAILS   Ms. Marcial was placed in the sitting position with her head and neck flexed.  Landmarks were palpated.  The area was prepped with ChloraPrep and sterile technique was used.  A 1.50 inch 25 gauge sterile needle was introduced 1/3 of the distance lateral from the occipital protuberance to the mastoid process on the right side.  After negative aspiration for blood, a solution containing 20 mg of triamcinolone acetonide diluted in a 1:2 mixture of 1% lidocaine and 0.5% bupivacaine for a total volume of 6 mL was injected in a fan-like fashion along the nuchal ridge between the occipital protuberance and mastoid process. The procedure was repeated in the exact same way on the opposite side with the same injectate.     Ms. Marcial tolerated the procedure well, and there were no apparent complications.  After appropriate observation, she was dismissed in good condition under her own  power.    COMMENTS  Ms. Marcial received a total of 40 mg of triamcinolone acetonide.  Her pain was 8/10 in severity prior to the procedure, and 3/10 following the procedure.    Lidocaine 1%, lot number [id number], expiration date June 2022  Bupivacaine 0.5%, lot number [id number], expiration date April 2023  Triamcinolone acetonide, lot number [id number], expiration date September 2021      Erin Garcia MD

## 2019-12-12 NOTE — CONSULTS
Gastroenterology Consultation  Katharina Marcial [id number] 39 year old 1980  12/12/2019        Date of Admission: 12/11/2019  Reason for consult: Abdominal pain  Requesting physician: Dr. Rascon, Robert Valderrama MD       Reason for Consultation:   Abdominal pain         HPI:   Katharina Marcial is a 39 year old female with a PMHX significant for IBS, headaches/migraines and herpes zosters who presented with abdominal pain.     She recently underwent a  Bunionectomy on 11/1/2019 for which she was prescribed narcotics. She took them for several days but had issues with hard stools/constipation. Two weeks after her surgery she was seen by her PCP who prescribed her miralax and Gatorade prep, but she had minimal output from the prep. She then was seen in the [facility] ED where she was admitted  from 12/2-12/3 for complaints of abdominal discomfort.  She had a CT performed of the abdomen and pelvis which showed fluid-filled loops of small and large bowel consistent with ileus or mild enteritis without any other abdominal findings. She was treated with miralax and one other pill that she cannot remember, but she was not discharged on any laxatives.     For the past few weeks she has been having abdominal pain, mostly LUQ pain with radiation to the back. She hasn't been taking miralax because since her discharge on 12/3/2019 she has been having one small loose stool every morning. She has switched to baby food and rice crackers, because those items won't trigger her abdominal pain. She previously was having abdominal pain with eating, which is why she adapted her diet. Her LUQ pain w/radiation to the back is crampy in nature, 7/10 today. She reports intermittent nausea, unclear precipitating factors. She has been taking 2 tablets of NSAIDs, 2 tablets of tylenol and trazodone intermittently for her pain. Denies any emesis, has been having dry heaving. Denies acid reflux or odynophagia, but reports several  year history of  "feeling as though foods get stuck in her mid sternum. She has had a barium swallow, esophagram, EGD and ENT evaluation for these symptoms, no clear etiology; EGD with mild gastropathy but H. Pylori negative. Denies any difficulty with initiating her swallow. Denies any fevers or night sweats.     Of note, she has not had any bowel movements since admission. In terms of abdominal surgeries she has had a partial hysterectomy in 2017 and a hernia repair in 2013. Per gastroenterology documentation 8/2018: at that time she had \"continued postprandial nausea cramping and left lower quadrant periumbilical discomfort\", which was documented as a chronic problem.    She also has been having issues with headaches since the beginning of 2019 in the setting of Herpes Zoster of her eye/left-sided neuralgia.    Patient's diet was advanced to chicken noodle soup last night, tolerated well.          Past Medical History:   - IBS  - Herpes Zoster (2018)  - Headaches/Migraines         Past Surgical History:   - S/p in 2017 partial hysterectomy   - Bunionectomy 11/1/2019  - 3 vaginal deliveries  - Mesh placement for umbilical hernia         Medications:     Current Facility-Administered Medications   Medication     acetaminophen (TYLENOL) Suppository 650 mg     acetaminophen (TYLENOL) tablet 650 mg     ketorolac (TORADOL) injection 15 mg     lidocaine (XYLOCAINE) 2 % 15 mL, alum & mag hydroxide-simethicone (MYLANTA ES/MAALOX  ES) 15 mL GI Cocktail     metoclopramide (REGLAN) injection 5 mg     naloxone (NARCAN) injection 0.1-0.4 mg     ondansetron (ZOFRAN-ODT) ODT tab 4 mg    Or     ondansetron (ZOFRAN) injection 4 mg     prochlorperazine (COMPAZINE) injection 10 mg    Or     prochlorperazine (COMPAZINE) tablet 10 mg    Or     prochlorperazine (COMPAZINE) Suppository 25 mg     sodium chloride 0.9% infusion     temazepam (RESTORIL) capsule 15 mg          Allergies   NKDA         Social History:   -   - Two kids  - Works as " "teacher  - Former smoker  - Intermittent alcohol use        Family History:   - Maternal aunt: MI in her 60s  - Mother: Breast CA  - Paternal grandmother: Breast CA  - Maternal Grandfather: pancreatic cancer         Review of Systems:   A complete 10 point review of systems was obtained.  Please see the HPI for pertinent positives and negatives.  All other systems were reviewed and were found to be negative.          Physical Exam:   VS:  /67 (BP Location: Right arm)   Pulse 75   Temp 98.1  F (36.7  C) (Oral)   Resp 14   Wt 71.2 kg (157 lb)   SpO2 100%   BMI 26.33 kg/m      Wt:   Wt Readings from Last 2 Encounters:   12/11/19 71.2 kg (157 lb)   12/11/19 71.6 kg (157 lb 14.4 oz)      Constitutional: cooperative, pleasant  Eyes: Sclera anicteric/injected  HEENT: Normal oropharynx without ulcers or exudate, mucus membranes moist  CV: RRR, no m/r/g  Respiratory: CTAB  Abd: Nondistended, soft, normoactive BS, tender to palpation in LUQ, no rebound or guarding   Skin: warm, perfused  - Right foot bandaged  Neuro: AAO x 3         Laboratory:   BMP  Recent Labs   Lab 12/11/19  1302      POTASSIUM 4.4   CHLORIDE 107   KAELA 9.2   CO2 26   BUN 10   CR 0.58   GLC 87     CBC  Recent Labs   Lab 12/11/19  1302   WBC 7.3   RBC 4.55   HGB 14.5   HCT 44.4   MCV 98   MCH 31.9   MCHC 32.7   RDW 12.8        LFTs  Recent Labs   Lab 12/11/19  1302   ALKPHOS 67   AST 24   ALT 25   BILITOTAL 0.6   PROTTOTAL 8.1   ALBUMIN 4.3      PANC  Recent Labs   Lab 12/11/19  1302   LIPASE 76     12/6/2019 stool studies:   - Bacterial enteric pathogen panel negative  - C.diff PCR negative  - O&P negative x3       Imaging/Procedures/Studies:   Colonoscopy 2/14/2018:   Per OSH reports, was done in [place] and \"negative.\"    Abdominal X-ray 12/11/2019:  FINDINGS:   Supine and upright abdominal radiographs were obtained.   Nonobstructive bowel gas pattern. No portal venous gas, pneumatosis or  free air in the abdomen. No " suspicious osseous lesions.The lung bases  are clear.                                              IMPRESSION: Nonobstructive bowel gas pattern.     CT Abdomen/Pelvis w/IV Contrast 12/2/2019:  FINDINGS:    A normal appendix is present. The bowels are normal in course and  caliber. There are fluid-filled loops of colon and a few fluid levels in the small bowel raising concern for a mild ileus or enteritis. The lung bases are clear. The liver, spleen, pancreas, and adrenal glands are unremarkable and symmetric in size and enhancement with no hydronephrosis. There is no retroperitoneal or mesenteric lymphadenopathy.    IMPRESSION:   1.  Fluid-filled loops of small and large bowel consistent with ileus or mild enteritis    2.  No other acute abdominal findings      X-ray Upper GI/Esphagram 5/20/2019:  FINDINGS:  The esophagus has a normal appearance.  Normal motility.  The stomach is  without gross mucosal abnormality.  Duodenum and proximal jejunum appear  normal.  No visible gastroesophageal reflux elicited.  No hiatal hernia  observed.  IMPRESSION:  Normal esophagus and upper GI exam.    Barium  Esophagram 4/2018:  1.   Essentially normal esophagram.   2.  Premature spill to the level of the piriform sinuses with no evidence of penetration or   aspiration.  Speech pathology consult ventricular and/or modified barium swallow may be of   added benefit.       Bacterial Overgrowth Breath Test 8/2018:  FINAL READING:  No breath test criteria met for the diagnosis of bacterial overgrowth.  Negative study.      EGD 12/2016:  Hiatal hernia and some gastroesophageal irritation.    Pathology:  A.  Duodenum, biopsy:  Superficial portions of villous small intestinal  type mucosa showing no significant pathologic abnormality.  B.  Stomach, biopsy:  Minimal chronic gastritis with mild foveolar  hyperplasia, suggestive of chemical gastropathy.     - An immunohistochemical stain, performed with adequate control to  evaluate for H.  pylori organisms, is negative.  C.  GE junction, biopsy:  Squamous and minimal glandular mucosa showing  mild chronic inflammation, basal hyperplasia and rare intraepithelial  eosinophils, suggestive of reflux.     - Negative for Padilla's type intestinal metaplasia and dysplasia.         Assessment and Plan:   Katharina Marcial is a 39 year old female with a PMHX significant for IBS, headaches/migraines and herpes zosters who presented with abdominal pain.     #. Abdominal pain, acute on chronic:  Patient with worsening LUQ pain in the setting of recent bunionectomy and narcotic use, now off narcotics. Per GI notes from OSH, she has been having post-prandial LUQ for years, more acute pain now. CT abdomen 12/2/2019 notable for  fluid-filled loops of colon and a few fluid levels; no splenomegaly or splenic abnormalities on imaging. Abdominal X-ray on admission with a non-obstructive bowel pattern. CBC, CMP and lipase on admission all within normal limits. Pain worse after eating, but now improving since admission. She has had an extensive workup in the past, including EGD, colonoscopy, esophagram and UGIS. Pt has been tested for SIBO 8/2018, negative. Extensive stool studies done prior to admission - negative C. Diff PCR, O&P x3 and bacterial enteric pathogen panel. She has been eating minimally since surgery with less < 5 lb weight loss (records from 2018 indicate weight close to 160, which has been stable). Concern for headaches/migraines causing worsening abdominal pain, potential etiology abdominal migraines. DDx includes renal stones (although no blood on UA) and viral gastroenteritis secondary to a virus not tested on the typical panel (norovirus and rotatvirus negative).   - F/u TSH and celiac studies (ordered for you)  - Recommend no further NSAID use, recommend tylenol PRN   - Recommend miralax daily, uptitrate as needed for one soft bowel movement per day  - Recommend walking outside the room TID  - Patient  needs close outpatient GI follow up to establish with one care provider who she follows with over time to establish a therapeutic relationship.   - Agree with outpatient pain clinic     It has been a pleasure to participate in the care of this patient.  Patient discussed with GI staff, Dr. Nicolas.  Please do not hesitate to contact the GI service with any questions or concerns.     Jany Alejandro (Lizzie)  Gastroenterology Fellow  Pager [phone]

## 2019-12-12 NOTE — DISCHARGE SUMMARY
Discharge Summary    Katharina Marcial MRN# [id number]   YOB: 1980 Age: 39 year old     Date of Admission:  12/11/2019  Date of Discharge:  12/12/2019  Admitting Physician:  Yosef Ac DO  Discharge Physician:  Robert Rascon MD   Discharging Service:  Emergency Medicine     Primary Provider: Herminia De La Garza          Discharge Diagnosis:     Intractable headache    Abdominal pain    * No resolved hospital problems. *               Discharge Disposition:   Discharged to home           Condition on Discharge:   Discharge condition: Stable   Code status on discharge: Full           Procedures:   No procedures performed during this admission          Discharge Medications:     Current Discharge Medication List      CONTINUE these medications which have NOT CHANGED    Details   Ascorbic Acid (VITAMIN C PO) Take 500 mg by mouth daily       melatonin 5 MG tablet Take 5-10 mg by mouth nightly as needed      Multiple Vitamins-Minerals (MULTIVITAMIN ADULT PO) Take 1 tablet by mouth daily       Omega-3 Fatty Acids (OMEGA 3 PO) Take by mouth daily       VITAMIN D, CHOLECALCIFEROL, PO Take 5,000 Units by mouth daily       diazepam (VALIUM) 10 MG tablet Take 30 minutes before MRI scans  Qty: 1 tablet, Refills: 0    Associated Diagnoses: Dizziness; Mydriasis; Right facial numbness; Numbness of right hand; Cervicalgia                   Consultations:   Consultation during this admission received from gastroenterology and neurology             Brief History of Illness:   Please see detailed H&P from 12/11/19, in brief: Katharina Marcial is a 39 year old female admitted on 12/11/2019. She has a history of ileus/mild enteritis, IBS, migraines, and herpes zoster (in January 2018 treated with antiviral medication) who presents to the Emergency Department today for evaluation of abdominal pain          Hospital Course:   1. Zoster ophthalmicus of the left eye:    2. Intractable headache: She reports that  she had zoster ophthalmicus of the left eye January of 2019, and subsequently developed stabbing headache pain that starts in the left occiput and radiates around the head to the left temporal area. Causes her to be nauseated. The patient reports the headache has worsened since her foot surgery on 11/1/19. Patient is able to keep some foods down such as baby food and rice crackers.  This occur several times throughout the day and into the evening. She takes Tylenol pm at night which takes the edge off and she is able to get a few hours of sleep at a time. She saw a general neurologist at [facility] who recommended that she get an occipital nerve block for occipital neuralgia, and she declined this intervention.   Patient was at neurology clinic today for further evaluation of this.  Patient had pulse noncontrasted and contrasted MRIs of the brain performed.1. No evidence for an acute intracranial abnormality.  No acute intracranial  hemorrhage, acute or subacute infarct, or intracranial mass. 2 mm of inferior cerebellar tonsillar ectopia, not meeting radiographic criteria for Chiari 1 malformation.  There is no edema the adjacent cerebellum or brainstem. Mild right mastoid tip effusion which can be seen with eustachian tube dysfunction or mild mastoiditis. Neurology saw patient in the ED and recommended: In the ED, she received IV Benadryl, Magnesium and Toradol with improvement of her headache.  She was given IV dexamethasone yesterday with minimal improvement.  Neurology consulted and feels this is occipital neuralgia and is recommended an occipital nervre block as outpatient.  This was scheduled in the clinic for either tonight or tomorrow am.  She was also given IV depakote once which did take the edge off of her headache.  Patient discharged in stable condition.  Follow up with neurology for the occipital nerve block and further management of her occipital neuralgia.      3. Abdominal pain:  4. Nausea:  Bunionectomy right foot on 11/1 and had been utilizing Hydrocodone with acetaminophen, as well as occasional cyclobenzaprine for pain. She had increasing abdominal pain and presented to [facility] ED in [place] from 12/2-12/3.  During this visit the patient had a CT performed of the abdomen and pelvis which showed fluid-filled loops of small and large bowel consistent with ileus or mild enteritis without any other abdominal findings. At discharge the patient's symptoms were thought to be a combination of constipation from narcotic use as well as irritable bowel syndrome.  She was given IV fluids, antiemetics, pain control  She started passing more flatus and was given additional dose of MiraLax with only clear watery stool with no increased stool frequency or viky diarrhea. Diet was advanced from clear to full liquids, then plan juice, slowly some solids. She did have some mild postprandial discomfort, nausea, but no emesis. Overall, symptoms improved. At discharge, she was recommended to follow a Modified diet with gradual elimination of possible offending foods, drink adequate water 6-8 glasses per day. Consider FOD MAP diet. She was given a prescription for Zofan, Levsin, Miralax as needed. She was recommended to follow up with her PCP if she was having continued symptoms. Patient did and did not find her PCP to be helpful. Patient was seen by neurology in clinic today and reported abdominal pain. Patient is not sure if the headache is causing the nausea and abdominal pain. She received a GI cocktail. Abdomen xray completed in the ED and showed Nonobstructive bowel gas.  pattern. Patient reports no improvement after GI cocktail. Is requesting to eat upon admission. UA negative. Negative pregnancy test.      She has had gastroenterology consultation last year and has had extensive workup, including EGD with biopsy, colonoscopy, as well as normal esophagram. She had previously met with gastroenterology regarding  this, as well as met with her dietitian and a FOD MAP diet.  GI consulted and recommended outpatient follow up with GI.  No further interventions while in the observation unit.  She had no further bowel movements or vomiting.        5. Bunionectomy right foot on 11/1: Currently in a CAM boot. Incision is CDI. Per Ortho follow up note on 11/5/19, patient is to continue utilizing the Ace bandage and shoe while walking over the next several weeks  - Continue Ace bandage and CAM boot  - Tylenol prn     6. Insomnia: reports Melatonin is not effective.  - Trial Restoril            Final Day of Progress before Discharge:       Physical Exam:  Blood pressure 110/64, pulse 75, temperature 98.4  F (36.9  C), temperature source Oral, resp. rate 16, weight 71.2 kg (157 lb), SpO2 100 %.    EXAM:  Exam:  Constitutional: healthy, alert and no distress  Head: Normocephalic. No masses, lesions, tenderness or abnormalities  Neck: Neck supple. No adenopathy.   ENT: ENT exam normal, no neck nodes or sinus tenderness  Cardiovascular: No lifts, heaves, or thrills. RRR. No murmurs, clicks gallops or rub  Respiratory: Good diaphragmatic excursion. Lungs clear  Gastrointestinal: Abdomen soft, non-tender. BS normal. No masses, organomegaly  Musculoskeletal: extremities normal- no gross deformities noted, and normal muscle tone  Skin: no suspicious lesions or rashes  Neurologic: Alert and oriented.   Psychiatric: mentation appears normal and affect normal/bright    /64 (BP Location: Right arm)   Pulse 75   Temp 98.4  F (36.9  C) (Oral)   Resp 16   Wt 71.2 kg (157 lb)   SpO2 100%   BMI 26.33 kg/m               Data:  All laboratory data reviewed             Significant Results:     Results for orders placed or performed during the hospital encounter of 12/11/19   CBC with platelets differential     Status: None   Result Value Ref Range    WBC 7.3 4.0 - 11.0 10e9/L    RBC Count 4.55 3.8 - 5.2 10e12/L    Hemoglobin 14.5 11.7 - 15.7  g/dL    Hematocrit 44.4 35.0 - 47.0 %    MCV 98 78 - 100 fl    MCH 31.9 26.5 - 33.0 pg    MCHC 32.7 31.5 - 36.5 g/dL    RDW 12.8 10.0 - 15.0 %    Platelet Count 223 150 - 450 10e9/L    Diff Method Manual Differential     % Neutrophils 79.6 %    % Lymphocytes 17.7 %    % Monocytes 2.7 %    % Eosinophils 0.0 %    % Basophils 0.0 %    Absolute Neutrophil 5.8 1.6 - 8.3 10e9/L    Absolute Lymphocytes 1.3 0.8 - 5.3 10e9/L    Absolute Monocytes 0.2 0.0 - 1.3 10e9/L    Absolute Eosinophils 0.0 0.0 - 0.7 10e9/L    Absolute Basophils 0.0 0.0 - 0.2 10e9/L    RBC Morphology Normal     Platelet Estimate Confirming automated cell count    Comprehensive metabolic panel     Status: None   Result Value Ref Range    Sodium 140 133 - 144 mmol/L    Potassium 4.4 3.4 - 5.3 mmol/L    Chloride 107 94 - 109 mmol/L    Carbon Dioxide 26 20 - 32 mmol/L    Anion Gap 6 3 - 14 mmol/L    Glucose 87 70 - 99 mg/dL    Urea Nitrogen 10 7 - 30 mg/dL    Creatinine 0.58 0.52 - 1.04 mg/dL    GFR Estimate >90 >60 mL/min/[1.73_m2]    GFR Estimate If Black >90 >60 mL/min/[1.73_m2]    Calcium 9.2 8.5 - 10.1 mg/dL    Bilirubin Total 0.6 0.2 - 1.3 mg/dL    Albumin 4.3 3.4 - 5.0 g/dL    Protein Total 8.1 6.8 - 8.8 g/dL    Alkaline Phosphatase 67 40 - 150 U/L    ALT 25 0 - 50 U/L    AST 24 0 - 45 U/L   Lipase     Status: None   Result Value Ref Range    Lipase 76 73 - 393 U/L   UA reflex to Microscopic and Culture     Status: Abnormal   Result Value Ref Range    Color Urine Yellow     Appearance Urine Clear     Glucose Urine Negative NEG^Negative mg/dL    Bilirubin Urine Negative NEG^Negative    Ketones Urine 80 (A) NEG^Negative mg/dL    Specific Gravity Urine 1.024 1.003 - 1.035    Blood Urine Negative NEG^Negative    pH Urine 6.0 5.0 - 7.0 pH    Protein Albumin Urine 10 (A) NEG^Negative mg/dL    Urobilinogen mg/dL Normal 0.0 - 2.0 mg/dL    Nitrite Urine Negative NEG^Negative    Leukocyte Esterase Urine Negative NEG^Negative    Source Other     RBC Urine 1 0 -  2 /HPF    WBC Urine 2 0 - 5 /HPF    Bacteria Urine Few (A) NEG^Negative /HPF    Squamous Epithelial /HPF Urine 1 0 - 1 /HPF    Mucous Urine Present (A) NEG^Negative /LPF   HCG qualitative urine (UPT)     Status: None   Result Value Ref Range    HCG Qual Urine Negative NEG^Negative   Magnesium     Status: None   Result Value Ref Range    Magnesium 2.2 1.6 - 2.3 mg/dL   TSH with free T4 reflex     Status: None   Result Value Ref Range    TSH 1.04 0.40 - 4.00 mU/L   EKG 12-lead, tracing only     Status: None   Result Value Ref Range    Interpretation ECG Click View Image link to view waveform and result       Recent Results (from the past 48 hour(s))   XR Abdomen 2 Views    Narrative    EXAM: XR ABDOMEN 2 VW  12/11/2019 1:48 PM     HISTORY:  diffuse pain, nausea, hx of recent ileus, r/o sbo       COMPARISON:  None    FINDINGS:   Supine and upright abdominal radiographs were obtained.   Nonobstructive bowel gas pattern. No portal venous gas, pneumatosis or  free air in the abdomen. No suspicious osseous lesions.The lung bases  are clear.      Impression    IMPRESSION: Nonobstructive bowel gas pattern.     I have personally reviewed the examination and initial interpretation  and I agree with the findings.    LANCE RODRIGUEZ DO                Pending Results:   Unresulted Labs Ordered in the Past 30 Days of this Admission     Date and Time Order Name Status Description    12/11/2019 1302 Tissue transglutaminase antibody IgA In process                   Discharge Instructions and Follow-Up:     Discharge Procedure Orders   GASTROENTEROLOGY ADULT REF CONSULT ONLY   Referral Priority: Routine   Number of Visits Requested: 1     Pain Management Referral     GASTROENTEROLOGY ADULT REF CONSULT ONLY   Referral Priority: Routine   Number of Visits Requested: 1     Reason for your hospital stay   Order Comments: Headaches and abdominal pain. Neurology was consulted and recommended outpatient occipital nerve block, this was  arranged through neurology.  GI consulted and recommended outpatient follow up with GI at the [facility] clinics.     Activity   Order Comments: Your activity upon discharge: activity as tolerated     Order Specific Question Answer Comments   Is discharge order? Yes      When to contact your care team   Order Comments: Return to the ER if you have worsening headaches, confusion, falls/fainting, uncontrollable vomiting, worsening abdominal pain, fevers.     Follow Up and recommended labs and tests   Order Comments: Follow up with primary care provider, Herminia De La Garza, within 7 days for hospital follow- up.     Diet   Order Comments: Follow this diet upon discharge: Orders Placed This Encounter      Regular Diet Adult     Order Specific Question Answer Comments   Is discharge order? Yes           Attestation:  BEKAH Egan CNP.

## 2019-12-12 NOTE — TELEPHONE ENCOUNTER
Pt's mom Lexus  called back - please call her cell at [phone]. She is waiting for your call. Thanks.

## 2019-12-12 NOTE — PLAN OF CARE
Observation Goals Prior to Discharge:  - Nausea improved: Yes  - Headache improved: In progress  - GI consult complete: To be completed this AM  - Neurology consult complete: Yes, completed in ED    Pt slept throughout night. Stated she is starting to have same headache type pain occurring but it is manageable and did not interrupt sleep as long as she did not lay on her affected side.

## 2019-12-12 NOTE — PLAN OF CARE
Observation Goals Prior to Discharge:  - Nausea improved: In progress  - Headache improved: In progress  - GI consult complete: To be completed in AM  - Neurology consult complete: Yes, completed in ED    Pt in good spirits. Stated she was very tired but other than that felt fine. Pt has trouble sleeping, so will let sleep as much as possible tonight. Will continue to monitor for pain and nausea.

## 2019-12-13 ENCOUNTER — TELEPHONE (OUTPATIENT)
Dept: NEUROLOGY | Facility: CLINIC | Age: 39
End: 2019-12-13

## 2019-12-13 LAB — TTG IGA SER-ACNC: 1 U/ML

## 2019-12-13 NOTE — PROGRESS NOTES
[facility]  Neurology Procedure  12/12/19     Katharina Marcial MRN# [id number]   YOB: 1980 Age: 39 year old            Occipital Nerve Block Procedure Note   DIAGNOSIS  Occipital neuralgia, bilateral     PROCEDURE   bilateral, Greater and Lesser occipital nerve blocks.      INFORMED CONSENT  I discussed the risks, benefits, alternatives, and the necessity of other members of the healthcare team participating in the procedure with the patient.  The patient understood and wished to proceed with the procedure.    PROCEDURAL PAUSE   Patient identity, procedure to be performed, correct side and site, patient position, availability of equipment, and special requirements were verified.      PROCEDURE DETAILS   Ms. Marcial was placed in the sitting position with her head and neck flexed.  Landmarks were palpated.  The area was prepped with ChloraPrep and sterile technique was used.  A 1.50 inch 25 gauge sterile needle was introduced 1/3 of the distance lateral from the occipital protuberance to the mastoid process on the right side.  After negative aspiration for blood, a solution containing 20 mg of triamcinolone acetonide diluted in a 1:2 mixture of 1% lidocaine and 0.5% bupivacaine for a total volume of 6 mL was injected in a fan-like fashion along the nuchal ridge between the occipital protuberance and mastoid process. The procedure was repeated in the exact same way on the opposite side with the same injectate.     Ms. Marcial tolerated the procedure well, and there were no apparent complications.  After appropriate observation, she was dismissed in good condition under her own power.    COMMENTS  Ms. Marcial received a total of 40 mg of triamcinolone acetonide.  Her pain was 8/10 in severity prior to the procedure, and 3/10 following the procedure.    Lidocaine 1%, lot number [id number], expiration date June 2022  Bupivacaine 0.5%, lot number [id number],  expiration date April 2023  Triamcinolone acetonide, lot number [id number], expiration date September 2021

## 2019-12-13 NOTE — TELEPHONE ENCOUNTER
I called pt back and spoke with her mother Lexus. I let Lexus know that per Dr. Garcia it is okay for Katharina to have her colonoscopy and endoscopy Monday.     Arelis ARGUELLES

## 2019-12-17 NOTE — TELEPHONE ENCOUNTER
RECORDS RECEIVED FROM: Internal, external   DATE RECEIVED: 2020   NOTES STATUS DETAILS   OFFICE NOTE from referring provider Internal 19 Viky Sosa     OFFICE NOTE from other specialist Care Everywhere 19 [place]  19 [facility]  8.15.18 [facility]  8.6.18  7.10.18     DISCHARGE SUMMARY from hospital Care Everywhere/internal 19 [employer]  19 [place]   OPERATIVE REPORT N/A    MEDICATION LIST Internal         ENDOSCOPY  Care Everywhere Esophagogastroduodenoscopy 19   COLONOSCOPY Care Everywhere 19   ERCP N/A    EUS N/A    STOOL TESTING N/A    PERTINENT LABS Internal    PATHOLOGY REPORTS (RELATED) N/A    IMAGING (CT, MRI, EGD) In process 12.3.19 xr abd  19 xr abd  19 ct abd pelvis  19 xr upper gi and esophagram (archived into PACS on )  18 us abd  Requested all abd imaging from [facility]     REFERRAL INFORMATION    Date referral was placed: 19   Date all records received: N/A   Date records were scanned into Epic: N/A   Date records were sent to Provider to review: N/A   Date and recommendation received from provider:  LETTER SENT  SCHEDULE APPOINTMENT   Date patient was contacted to schedule: 19     Action 19 MJ 10:17 AM   Action Taken Requested imaging from [facility].     Action 2020 -Bhao    Action Taken Fax request for images sent to:    [facility] ([phone]):   - XR  Abdomen 12/3/19  - CT Abdomen Pelvis 19    [facility] ([phone])  - US Abdomen: 18  [employer] [facility]     Action 2020 -Bhao   Action Taken Images from [facility] an [facility] has been archived into PACS.

## 2019-12-23 ENCOUNTER — TELEPHONE (OUTPATIENT)
Dept: NEUROLOGY | Facility: CLINIC | Age: 39
End: 2019-12-23

## 2019-12-23 NOTE — TELEPHONE ENCOUNTER
Health Call Center    Phone Message    May a detailed message be left on voicemail: yes    Reason for Call: Symptoms or Concerns     Current symptom or concern: Continuing pain after provider gave pt a nerve block shot, pain causes vomiting, pt cannot eat when pain starts, just cause vomiting to start    Symptoms have been present for:  11 day(s), pt was given a shot, one on each side of the occipital nerve to block the pain    Has patient previously been seen for this? Yes    By: Dr. Erin Garcia    Date: 12/12/19    Are there any new or worsening symptoms? Yes, please call pt's mother, Lexus, to help this pt . Thank you.      Action Taken: Message routed to:  [facility] ([facility]  Neurology

## 2019-12-23 NOTE — TELEPHONE ENCOUNTER
"I called Lexus back to discuss Katharina symptoms. Katharina had an occipital nerve block with Dr. Garcia on 12/12. Katharina had some relief at first of her headache but it never abated. She cannot eat due to pain; she has nausea and vomiting. Katharnia has lot about 20 pounds.     Lexus stated this head pain started last January when she had shingles which attacked her occipital nerve. She has been having therapy with a Dr. Barrett in [place] for this. She had foot surgery 11/1/19 after which she had severe stomach issues which led to increased head pain. She has what she describes as \"zingers\" which start behind her L eye and go behind L ear.     She has not been able to sleep well. She has tried numerous medications with no relief. She is very sensitive to medications. She has had numerous tests and no answers so far.     She is looking for recommendations for care. They would prefer to avoid ER as they have not had much help when they did go in. I did let her know that is our recommendation for the acute pain. I helped set her up with appointment with Dr. Garcia on January 13th.     They would love an update with any thoughts on a care plan in the mean time. I will reach out to provider for further advise.     Arelis ARGUELLES  "

## 2019-12-26 ENCOUNTER — TELEPHONE (OUTPATIENT)
Dept: NEUROLOGY | Facility: CLINIC | Age: 39
End: 2019-12-26

## 2019-12-26 DIAGNOSIS — R11.0 NAUSEA: Primary | ICD-10-CM

## 2019-12-26 RX ORDER — ONDANSETRON 4 MG/1
4 TABLET, ORALLY DISINTEGRATING ORAL EVERY 6 HOURS PRN
Status: ACTIVE | OUTPATIENT
Start: 2019-12-26

## 2019-12-26 NOTE — TELEPHONE ENCOUNTER
I called pt mother Katharina to let her know that Adri was willing to order zofran to help with Katharina nausea. Lexus said that pt has continued to have nausea and vomiting. Her headache is still there; a little better on days she can get some sleep. They have follow up with Dr. Garcia on 1/13 at 1:30 to discuss headache treatment/plan. Lexus said they are just looking for a direction.       Arelis ARGUELLES

## 2020-02-03 ENCOUNTER — TELEPHONE (OUTPATIENT)
Dept: GASTROENTEROLOGY | Facility: CLINIC | Age: 40
End: 2020-02-03

## 2020-02-03 NOTE — TELEPHONE ENCOUNTER
Called and left message for patient reminding of appointment scheduled on 2/4/20 at 340pm with [facility]. Patient to arrive 15 min early. To reschedule or cancel patient to call [phone].    FLORINA Albarran

## 2020-02-04 ENCOUNTER — PRE VISIT (OUTPATIENT)
Dept: GASTROENTEROLOGY | Facility: CLINIC | Age: 40
End: 2020-02-04

## (undated) RX ORDER — TRIAMCINOLONE ACETONIDE 40 MG/ML
INJECTION, SUSPENSION INTRA-ARTICULAR; INTRAMUSCULAR
Status: DISPENSED
Start: 2019-12-12

## (undated) RX ORDER — LIDOCAINE HYDROCHLORIDE 10 MG/ML
INJECTION, SOLUTION EPIDURAL; INFILTRATION; INTRACAUDAL; PERINEURAL
Status: DISPENSED
Start: 2019-12-12

## (undated) RX ORDER — BUPIVACAINE HYDROCHLORIDE 5 MG/ML
INJECTION, SOLUTION EPIDURAL; INTRACAUDAL
Status: DISPENSED
Start: 2019-12-12